# Patient Record
Sex: FEMALE | Race: BLACK OR AFRICAN AMERICAN | NOT HISPANIC OR LATINO
[De-identification: names, ages, dates, MRNs, and addresses within clinical notes are randomized per-mention and may not be internally consistent; named-entity substitution may affect disease eponyms.]

---

## 2017-01-31 ENCOUNTER — APPOINTMENT (OUTPATIENT)
Dept: CARDIOLOGY | Facility: CLINIC | Age: 82
End: 2017-01-31

## 2017-01-31 ENCOUNTER — NON-APPOINTMENT (OUTPATIENT)
Age: 82
End: 2017-01-31

## 2017-01-31 VITALS — DIASTOLIC BLOOD PRESSURE: 60 MMHG | SYSTOLIC BLOOD PRESSURE: 150 MMHG

## 2017-01-31 VITALS — SYSTOLIC BLOOD PRESSURE: 186 MMHG | HEART RATE: 60 BPM | DIASTOLIC BLOOD PRESSURE: 67 MMHG | OXYGEN SATURATION: 95 %

## 2017-01-31 DIAGNOSIS — E78.00 PURE HYPERCHOLESTEROLEMIA, UNSPECIFIED: ICD-10-CM

## 2017-01-31 DIAGNOSIS — I10 ESSENTIAL (PRIMARY) HYPERTENSION: ICD-10-CM

## 2017-01-31 DIAGNOSIS — R01.1 CARDIAC MURMUR, UNSPECIFIED: ICD-10-CM

## 2017-01-31 DIAGNOSIS — Z01.818 ENCOUNTER FOR OTHER PREPROCEDURAL EXAMINATION: ICD-10-CM

## 2017-02-13 ENCOUNTER — INPATIENT (INPATIENT)
Facility: HOSPITAL | Age: 82
LOS: 1 days | Discharge: HOME | DRG: 378 | End: 2017-02-15
Attending: HOSPITALIST | Admitting: INTERNAL MEDICINE
Payer: MEDICARE

## 2017-02-13 VITALS
HEART RATE: 63 BPM | TEMPERATURE: 97 F | HEIGHT: 64 IN | WEIGHT: 149.91 LBS | SYSTOLIC BLOOD PRESSURE: 125 MMHG | RESPIRATION RATE: 20 BRPM | OXYGEN SATURATION: 95 % | DIASTOLIC BLOOD PRESSURE: 62 MMHG

## 2017-02-13 DIAGNOSIS — K92.2 GASTROINTESTINAL HEMORRHAGE, UNSPECIFIED: ICD-10-CM

## 2017-02-13 LAB
ALBUMIN SERPL ELPH-MCNC: 2.9 G/DL — LOW (ref 3.3–5.2)
ALP SERPL-CCNC: 65 U/L — SIGNIFICANT CHANGE UP (ref 40–120)
ALT FLD-CCNC: 6 U/L — SIGNIFICANT CHANGE UP
ANION GAP SERPL CALC-SCNC: 10 MMOL/L — SIGNIFICANT CHANGE UP (ref 5–17)
APTT BLD: 32.3 SEC — SIGNIFICANT CHANGE UP (ref 27.5–37.4)
AST SERPL-CCNC: 15 U/L — SIGNIFICANT CHANGE UP
BASOPHILS # BLD AUTO: 0 K/UL — SIGNIFICANT CHANGE UP (ref 0–0.2)
BASOPHILS NFR BLD AUTO: 0.1 % — SIGNIFICANT CHANGE UP (ref 0–2)
BILIRUB SERPL-MCNC: 0.3 MG/DL — LOW (ref 0.4–2)
BLD GP AB SCN SERPL QL: SIGNIFICANT CHANGE UP
BUN SERPL-MCNC: 15 MG/DL — SIGNIFICANT CHANGE UP (ref 8–20)
CALCIUM SERPL-MCNC: 9.7 MG/DL — SIGNIFICANT CHANGE UP (ref 8.6–10.2)
CHLORIDE SERPL-SCNC: 104 MMOL/L — SIGNIFICANT CHANGE UP (ref 98–107)
CO2 SERPL-SCNC: 25 MMOL/L — SIGNIFICANT CHANGE UP (ref 22–29)
CREAT SERPL-MCNC: 0.55 MG/DL — SIGNIFICANT CHANGE UP (ref 0.5–1.3)
EOSINOPHIL # BLD AUTO: 0.1 K/UL — SIGNIFICANT CHANGE UP (ref 0–0.5)
EOSINOPHIL NFR BLD AUTO: 0.6 % — SIGNIFICANT CHANGE UP (ref 0–6)
GLUCOSE SERPL-MCNC: 150 MG/DL — HIGH (ref 70–115)
HCT VFR BLD CALC: 29.1 % — LOW (ref 37–47)
HCT VFR BLD CALC: 33.9 % — LOW (ref 37–47)
HGB BLD-MCNC: 10.5 G/DL — LOW (ref 12–16)
HGB BLD-MCNC: 9.1 G/DL — LOW (ref 12–16)
INR BLD: 1.15 RATIO — SIGNIFICANT CHANGE UP (ref 0.88–1.16)
LYMPHOCYTES # BLD AUTO: 0.8 K/UL — LOW (ref 1–4.8)
LYMPHOCYTES # BLD AUTO: 7.8 % — LOW (ref 20–55)
MCHC RBC-ENTMCNC: 26.3 PG — LOW (ref 27–31)
MCHC RBC-ENTMCNC: 26.5 PG — LOW (ref 27–31)
MCHC RBC-ENTMCNC: 31 G/DL — LOW (ref 32–36)
MCHC RBC-ENTMCNC: 31.3 G/DL — LOW (ref 32–36)
MCV RBC AUTO: 84.8 FL — SIGNIFICANT CHANGE UP (ref 81–99)
MCV RBC AUTO: 84.8 FL — SIGNIFICANT CHANGE UP (ref 81–99)
MONOCYTES # BLD AUTO: 0.7 K/UL — SIGNIFICANT CHANGE UP (ref 0–0.8)
MONOCYTES NFR BLD AUTO: 6.6 % — SIGNIFICANT CHANGE UP (ref 3–10)
NEUTROPHILS # BLD AUTO: 8.7 K/UL — HIGH (ref 1.8–8)
NEUTROPHILS NFR BLD AUTO: 84.5 % — HIGH (ref 37–73)
PLATELET # BLD AUTO: 214 K/UL — SIGNIFICANT CHANGE UP (ref 150–400)
PLATELET # BLD AUTO: 251 K/UL — SIGNIFICANT CHANGE UP (ref 150–400)
POTASSIUM SERPL-MCNC: 4.8 MMOL/L — SIGNIFICANT CHANGE UP (ref 3.5–5.3)
POTASSIUM SERPL-SCNC: 4.8 MMOL/L — SIGNIFICANT CHANGE UP (ref 3.5–5.3)
PROT SERPL-MCNC: 6.5 G/DL — LOW (ref 6.6–8.7)
PROTHROM AB SERPL-ACNC: 12.7 SEC — SIGNIFICANT CHANGE UP (ref 10–13.1)
RBC # BLD: 3.43 M/UL — LOW (ref 4.4–5.2)
RBC # BLD: 4 M/UL — LOW (ref 4.4–5.2)
RBC # FLD: 16 % — HIGH (ref 11–15.6)
RBC # FLD: 16.1 % — HIGH (ref 11–15.6)
SODIUM SERPL-SCNC: 139 MMOL/L — SIGNIFICANT CHANGE UP (ref 135–145)
TYPE + AB SCN PNL BLD: SIGNIFICANT CHANGE UP
WBC # BLD: 10.32 K/UL — SIGNIFICANT CHANGE UP (ref 4.8–10.8)
WBC # BLD: 9.42 K/UL — SIGNIFICANT CHANGE UP (ref 4.8–10.8)
WBC # FLD AUTO: 10.32 K/UL — SIGNIFICANT CHANGE UP (ref 4.8–10.8)
WBC # FLD AUTO: 9.42 K/UL — SIGNIFICANT CHANGE UP (ref 4.8–10.8)

## 2017-02-13 PROCEDURE — 93010 ELECTROCARDIOGRAM REPORT: CPT

## 2017-02-13 PROCEDURE — 74174 CTA ABD&PLVS W/CONTRAST: CPT | Mod: 26

## 2017-02-13 PROCEDURE — 99223 1ST HOSP IP/OBS HIGH 75: CPT

## 2017-02-13 PROCEDURE — 99285 EMERGENCY DEPT VISIT HI MDM: CPT

## 2017-02-13 PROCEDURE — 71010: CPT | Mod: 26

## 2017-02-13 RX ORDER — GABAPENTIN 400 MG/1
300 CAPSULE ORAL DAILY
Qty: 0 | Refills: 0 | Status: DISCONTINUED | OUTPATIENT
Start: 2017-02-13 | End: 2017-02-15

## 2017-02-13 RX ORDER — TIMOLOL 0.5 %
1 DROPS OPHTHALMIC (EYE)
Qty: 0 | Refills: 0 | Status: DISCONTINUED | OUTPATIENT
Start: 2017-02-13 | End: 2017-02-15

## 2017-02-13 RX ORDER — SODIUM CHLORIDE 9 MG/ML
3 INJECTION INTRAMUSCULAR; INTRAVENOUS; SUBCUTANEOUS ONCE
Qty: 0 | Refills: 0 | Status: COMPLETED | OUTPATIENT
Start: 2017-02-13 | End: 2017-02-13

## 2017-02-13 RX ORDER — BRIMONIDINE TARTRATE 2 MG/MG
1 SOLUTION/ DROPS OPHTHALMIC
Qty: 0 | Refills: 0 | Status: DISCONTINUED | OUTPATIENT
Start: 2017-02-13 | End: 2017-02-15

## 2017-02-13 RX ORDER — LATANOPROST 0.05 MG/ML
1 SOLUTION/ DROPS OPHTHALMIC; TOPICAL AT BEDTIME
Qty: 0 | Refills: 0 | Status: DISCONTINUED | OUTPATIENT
Start: 2017-02-13 | End: 2017-02-15

## 2017-02-13 RX ADMIN — LATANOPROST 1 DROP(S): 0.05 SOLUTION/ DROPS OPHTHALMIC; TOPICAL at 22:20

## 2017-02-13 RX ADMIN — Medication 1 DROP(S): at 19:26

## 2017-02-13 RX ADMIN — BRIMONIDINE TARTRATE 1 DROP(S): 2 SOLUTION/ DROPS OPHTHALMIC at 19:17

## 2017-02-13 RX ADMIN — SODIUM CHLORIDE 3 MILLILITER(S): 9 INJECTION INTRAMUSCULAR; INTRAVENOUS; SUBCUTANEOUS at 05:52

## 2017-02-13 NOTE — CONSULT NOTE ADULT - SUBJECTIVE AND OBJECTIVE BOX
HPI:   89 yo F with hx of hemorrhoids and previous GI bleeding secondary to diverticulosis presents to ED brought by family c/o rectal bleeding which started 2 days ago.  Family states blood was initially bright but became darker today with assoc abd pain and cramping.  Pt denies any assoc CP, but does admit to some SOB.  Family states pt supposed to have intestinal surgery for abnormal cells on her intestines.  GI consult for above complaints. patient underwent egd and colonoscopy 10/2016 when she was admitted here for rectal bleed. Hb dropped down to 9. She is Hindu. colonoscopy showed pan-diverticulosis with old blood, and a 3 cm cecal board based sessile polyp (path showed TV adenoma with focal HGD). As per patient's daughter, she is scheduled for surgery at [facility] in march 2017. hemodynamically stable.	    PAST MEDICAL/SURGICAL/FAMILY/SOCIAL HISTORY:   Past Medical History:  Borderline diabetes    Diverticulosis    Glaucoma    Hemorrhoid    Hypertension    Shingles.    Tobacco Usage:  · Tobacco Usage	Unknown if ever smoked	    ALLERGIES AND HOME MEDICATIONS:   Allergies:        Allergies:  	Tylenol: Drug, Other (Mild to Mod), palpitations, RdhlPriscilla E  	aspirin: Drug, Other (Mild to Mod), palpitations, Priscilla Leone    Home Medications:   * Incomplete Medication History as of 13-Feb-2017 05:05 documented in Prescription Writer  · 	ferrous sulfate 325 mg oral tablet: Last Dose Taken:  , 1 tab(s) orally 3 times a day  · 	insulin lispro 100 units/mL subcutaneous solution: Last Dose Taken:  ,  subcutaneous 4 times a day (before meals and at bedtime), 1 Unit(s) if Glucose 151 - 200  	2 Unit(s) if Glucose 201 - 250  	3 Unit(s) if Glucose 251 - 300  	4 Unit(s) if Glucose 301 - 350  	5 Unit(s) if Glucose 351 - 400  	6 Unit(s) if Glucose GREATER THAN 400  · 	traMADol 50 mg oral tablet: 1 tab(s) orally every 4 hours, As needed, Moderate Pain (4 - 6)  · 	docusate sodium 100 mg oral capsule: Last Dose Taken:  , 1 cap(s) orally 2 times a day  · 	polyethylene glycol 3350 oral powder for reconstitution: 17 gram(s) orally once a day, As needed, Constipation  · 	amLODIPine 10 mg oral tablet: Last Dose Taken:  , 1 tab(s) orally once a day  · 	Protonix 40 mg oral delayed release tablet: 1 tab(s) orally once a day  · 	Combigan 0.2%-0.5% ophthalmic solution: Last Dose Taken:  , 1 drop(s) to each affected eye every 12 hours  · 	Travatan 0.004% ophthalmic solution: 1 drop(s) to each affected eye once a day (in the evening)  · 	Catapres: Last Dose Taken:  , 0.3 milligram(s) transdermal once a week (changed on Sunday 9/25/16)  · 	metFORMIN 500 mg oral tablet: 1 tab(s) orally 2 times a day      REVIEW OF SYSTEMS    General: unremarkable, no fever    Skin/Breast: unremarkable  	  Ophthalmologic: unremarkable  	  ENMT:	unremarkable    Respiratory and Thorax: unremarkable  	  Cardiovascular:	unremarkable    Gastrointestinal:	 as above    Genitourinary:	unremarkable    Musculoskeletal:	unremarkable    Neurological:	unremarkable    Psychiatric:	unremarkable    Hematology/Lymphatics:	unremarkable    Endocrine:	unremarkable    Allergic/Immunologic:	unremarkable      MEDICATIONS  (STANDING):    MEDICATIONS  (PRN):      Allergies    aspirin (Other (Mild to Mod))  Tylenol (Other (Mild to Mod))    Intolerances        SOCIAL HISTORY:    FAMILY HISTORY:  No pertinent family history in first degree relatives      Vital Signs Last 24 Hrs  T(C): 36.3, Max: 36.3 (02-13 @ 05:01)  T(F): 97.4, Max: 97.4 (02-13 @ 05:01)  HR: 63 (63 - 63)  BP: 125/62 (125/62 - 125/62)  BP(mean): --  RR: 20 (20 - 20)  SpO2: 95% (95% - 95%)      PHYSICAL EXAM:    Constitutional: no fever, hemodynamically stable    Eyes: unremarkable    ENMT: unremarkable    Neck: unremarkable    Breasts: deferred    Back: unremarkable    Respiratory: unremarkable    Cardiovascular: unremarkable    Gastrointestinal: bowel sounds present, soft, non-tender, no organomegaly    Genitourinary: deferred    Rectal: deferred    Extremities: unremarkable    Vascular: unremarkable    Neurological: Awake, alert, oriented x3, no focal neurological deficit    Skin: unremarkable    Lymph Nodes: deferred    Musculoskeletal: unremarkable    Psychiatric: unremarkable    LABS:                        10.5   10.32 )-----------( 214      ( 13 Feb 2017 06:01 )             33.9     13 Feb 2017 06:01    139    |  104    |  15.0   ----------------------------<  150    4.8     |  25.0   |  0.55     Ca    9.7        13 Feb 2017 06:01    TPro  6.5    /  Alb  2.9    /  TBili  0.3    /  DBili  x      /  AST  15     /  ALT  6      /  AlkPhos  65     13 Feb 2017 06:01    PT/INR - ( 13 Feb 2017 06:01 )   PT: 12.7 sec;   INR: 1.15 ratio         PTT - ( 13 Feb 2017 06:01 )  PTT:32.3 sec      RADIOLOGY & ADDITIONAL STUDIES:

## 2017-02-13 NOTE — ED PROVIDER NOTE - OBJECTIVE STATEMENT
89 yo F with hx of hemorrhoids and previous GI bleeding secondary to diverticulosis presents to ED brought by family c/o rectal bleeding which started 2 days ago.  Family states blood was initially bright but became darker today with assoc abd pain and cramping.  Pt denies any assoc CP, but does admit to some SOB.  Family states pt supposed to have intestinal surgery for abnormal cells on her intestines

## 2017-02-13 NOTE — H&P ADULT. - HISTORY OF PRESENT ILLNESS
89 y F hx HTN, DM2, GIB, anemia, presented to ER c/o 2 day history of rectal bleeding, first bright red in color, then becoming darker yesterday. c/o intermittent abd cramping, NBNB vomiting x1. Had episode of GIB in oct, colonoscopy showed cecal sessile polyp, TV adenoma on path, had tentative surgery scheduled for March pending clearance. Denies F/C, CP, SOB, dysuria.

## 2017-02-13 NOTE — H&P ADULT. - ASSESSMENT
89 y F hx HTN, Dm2, colonic polyp, GIB adm w rectal bleeding.     GIB: monitor CBC, colonoscopy in oct showed adenomatous colonic polyp. GI evaluation appreciated, to consider rpt colonoscopy if hb declines. cont to monitor. Pt is W[employer], no blood transfusions. f/u CTA abd/pelvis  HTN: cont home meds as tolerated  DM: not on any meds, consistent carb diet.    Prophyl: scds

## 2017-02-13 NOTE — CONSULT NOTE ADULT - ASSESSMENT
IMPRESSION:  This is a 89 ys opld female with h/o diverticular bleed and a 3 cm cecal sessile TV adenoma with focal HGD (found on colonoscopy 10/16) [facility] to [facility]. She is Mormon. As per patient's daughter, she is scheduled for colon surgery at [facility] next month.    PLAN:  - monitor cbc  - if h/h dropping, will consider repeat colonoscopy and IV iron therapy    thanks for the consult.

## 2017-02-13 NOTE — ED ADULT NURSE NOTE - OBJECTIVE STATEMENT
Pt awake and alert x3, presents to ED c/o rectal bleeding x 3 days. Pt reports history of internal and external hemorrhoids. Pt reports bright red blood that progressed to maroon/dark red blood earlier today. Pt reports abd pain and discomfort. Pt reports abd cramping. Abd soft, nontender and nondistended. Pt denies any n,v,d. pt incontinent of urine and wears diaper. As per pt's daughter, pt recently diagnosed with precancerous ulcerative colitis. Pt resting comfortably in bed, family at bedside. Will continue to monitor.

## 2017-02-13 NOTE — ED PROVIDER NOTE - CONSTITUTIONAL, MLM
normal... Elderly F,  awake, alert, oriented to person, place, time/situation and in no apparent distress.

## 2017-02-14 LAB
ANION GAP SERPL CALC-SCNC: 9 MMOL/L — SIGNIFICANT CHANGE UP (ref 5–17)
BUN SERPL-MCNC: 11 MG/DL — SIGNIFICANT CHANGE UP (ref 8–20)
CALCIUM SERPL-MCNC: 10 MG/DL — SIGNIFICANT CHANGE UP (ref 8.6–10.2)
CHLORIDE SERPL-SCNC: 106 MMOL/L — SIGNIFICANT CHANGE UP (ref 98–107)
CO2 SERPL-SCNC: 23 MMOL/L — SIGNIFICANT CHANGE UP (ref 22–29)
CREAT SERPL-MCNC: 0.54 MG/DL — SIGNIFICANT CHANGE UP (ref 0.5–1.3)
GLUCOSE SERPL-MCNC: 104 MG/DL — SIGNIFICANT CHANGE UP (ref 70–115)
HCT VFR BLD CALC: 30.5 % — LOW (ref 37–47)
HGB BLD-MCNC: 9.4 G/DL — LOW (ref 12–16)
MAGNESIUM SERPL-MCNC: 2.4 MG/DL — SIGNIFICANT CHANGE UP (ref 1.8–2.5)
MCHC RBC-ENTMCNC: 26.1 PG — LOW (ref 27–31)
MCHC RBC-ENTMCNC: 30.8 G/DL — LOW (ref 32–36)
MCV RBC AUTO: 84.7 FL — SIGNIFICANT CHANGE UP (ref 81–99)
PHOSPHATE SERPL-MCNC: 2.7 MG/DL — SIGNIFICANT CHANGE UP (ref 2.4–4.7)
PLATELET # BLD AUTO: 296 K/UL — SIGNIFICANT CHANGE UP (ref 150–400)
POTASSIUM SERPL-MCNC: 4.1 MMOL/L — SIGNIFICANT CHANGE UP (ref 3.5–5.3)
POTASSIUM SERPL-SCNC: 4.1 MMOL/L — SIGNIFICANT CHANGE UP (ref 3.5–5.3)
RBC # BLD: 3.6 M/UL — LOW (ref 4.4–5.2)
RBC # FLD: 15.9 % — HIGH (ref 11–15.6)
SODIUM SERPL-SCNC: 138 MMOL/L — SIGNIFICANT CHANGE UP (ref 135–145)
TROPONIN T SERPL-MCNC: <0.01 NG/ML — SIGNIFICANT CHANGE UP (ref 0–0.06)
TROPONIN T SERPL-MCNC: <0.01 NG/ML — SIGNIFICANT CHANGE UP (ref 0–0.06)
WBC # BLD: 9.16 K/UL — SIGNIFICANT CHANGE UP (ref 4.8–10.8)
WBC # FLD AUTO: 9.16 K/UL — SIGNIFICANT CHANGE UP (ref 4.8–10.8)

## 2017-02-14 PROCEDURE — 93010 ELECTROCARDIOGRAM REPORT: CPT

## 2017-02-14 PROCEDURE — 99233 SBSQ HOSP IP/OBS HIGH 50: CPT

## 2017-02-14 RX ORDER — PANTOPRAZOLE SODIUM 20 MG/1
40 TABLET, DELAYED RELEASE ORAL
Qty: 0 | Refills: 0 | Status: DISCONTINUED | OUTPATIENT
Start: 2017-02-14 | End: 2017-02-15

## 2017-02-14 RX ORDER — HYDRALAZINE HCL 50 MG
25 TABLET ORAL DAILY
Qty: 0 | Refills: 0 | Status: DISCONTINUED | OUTPATIENT
Start: 2017-02-14 | End: 2017-02-15

## 2017-02-14 RX ORDER — AMLODIPINE BESYLATE 2.5 MG/1
10 TABLET ORAL DAILY
Qty: 0 | Refills: 0 | Status: DISCONTINUED | OUTPATIENT
Start: 2017-02-14 | End: 2017-02-15

## 2017-02-14 RX ADMIN — Medication 1 PATCH: at 12:38

## 2017-02-14 RX ADMIN — LATANOPROST 1 DROP(S): 0.05 SOLUTION/ DROPS OPHTHALMIC; TOPICAL at 22:51

## 2017-02-14 RX ADMIN — Medication 1 DROP(S): at 05:53

## 2017-02-14 RX ADMIN — GABAPENTIN 300 MILLIGRAM(S): 400 CAPSULE ORAL at 12:38

## 2017-02-14 RX ADMIN — BRIMONIDINE TARTRATE 1 DROP(S): 2 SOLUTION/ DROPS OPHTHALMIC at 17:14

## 2017-02-14 RX ADMIN — Medication 1 DROP(S): at 17:14

## 2017-02-14 RX ADMIN — Medication 25 MILLIGRAM(S): at 12:37

## 2017-02-14 RX ADMIN — PANTOPRAZOLE SODIUM 40 MILLIGRAM(S): 20 TABLET, DELAYED RELEASE ORAL at 22:49

## 2017-02-14 RX ADMIN — BRIMONIDINE TARTRATE 1 DROP(S): 2 SOLUTION/ DROPS OPHTHALMIC at 05:53

## 2017-02-14 NOTE — PROGRESS NOTE ADULT - SUBJECTIVE AND OBJECTIVE BOX
INTERVAL HPI/OVERNIGHT EVENTS:  Patient seen and examined    MEDICATIONS  (STANDING):  cloNIDine Patch 0.3 mG/24Hr(s) 1patch Topical every 7 days  gabapentin 300milliGRAM(s) Oral daily  latanoprost 0.005% Ophthalmic Solution 1Drop(s) Both EYES at bedtime  brimonidine 0.2% Ophthalmic Solution 1Drop(s) Both EYES two times a day  timolol 0.5% Solution 1Drop(s) Both EYES two times a day  pantoprazole    Tablet 40milliGRAM(s) Oral before breakfast  amLODIPine   Tablet 10milliGRAM(s) Oral daily  hydrALAZINE 25milliGRAM(s) Oral daily    MEDICATIONS  (PRN):      Allergies    aspirin (Other (Mild to Mod))  Tylenol (Other (Mild to Mod))    Intolerances        Vital Signs Last 24 Hrs  T(C): 36.6, Max: 36.7 (02-14 @ 04:52)  T(F): 97.8, Max: 98 (02-14 @ 04:52)  HR: 52 (52 - 70)  BP: 171/94 (154/57 - 171/94)  BP(mean): --  RR: 18 (16 - 18)  SpO2: 98% (94% - 99%)    PHYSICAL EXAM:  General: NAD.  CVS: S1, S2  Chest: air entry bilaterally present  Abd: BS present, soft, non-tender      LABS:                        9.4    9.16  )-----------( 296      ( 14 Feb 2017 08:18 )             30.5     14 Feb 2017 08:18    138    |  106    |  11.0   ----------------------------<  104    4.1     |  23.0   |  0.54     Ca    10.0       14 Feb 2017 08:18  Phos  2.7       14 Feb 2017 08:18  Mg     2.4       14 Feb 2017 08:18    TPro  6.5    /  Alb  2.9    /  TBili  0.3    /  DBili  x      /  AST  15     /  ALT  6      /  AlkPhos  65     13 Feb 2017 06:01    PT/INR - ( 13 Feb 2017 06:01 )   PT: 12.7 sec;   INR: 1.15 ratio         PTT - ( 13 Feb 2017 06:01 )  PTT:32.3 sec    CT abdomen/pelvis: CT angiography of the abdomen and pelvis shows no evidence of active   extravasation within the colon or rectum. Diffuse diverticulosis throughout the entire colon as noted. An  Right hepatic lobe superior segment 8 measuring 1.7 cm hypodense lesion of indeterminate etiology. An  Cholelithiasis without signs of acute cholecystitis. No evidence of arterial occlusion of atherosclerotic abdominal aorta.   Mesenteric vessels ramos

## 2017-02-14 NOTE — ED ADULT NURSE REASSESSMENT NOTE - CARDIO WDL
Normal rate, regular rhythm, normal S1, S2 heart sounds heard.
Normal rate, normal S1, S2 heart sounds heard.

## 2017-02-14 NOTE — PROGRESS NOTE ADULT - ASSESSMENT
89 y F hx HTN, Dm2, colonic polyp, GIB adm w rectal bleeding.     GIB with hx of TV adenoma, diverticulosis, hemorrhoids: monitor CBC in am, colonoscopy in oct showed adenomatous colonic polyp. GI evaluation appreciated, to consider rpt colonoscopy if hb declines. cont to monitor. Pt is Sikh, no blood transfusions. CTA abd/pelvis reviewed  HTN: cont home meds as tolerated  DM: not on any meds, consistent carb diet.  Chest pain: EKG with LVH & repolarization changes, repeat EKG, TnI x 3, Echo    Prophyl: scds

## 2017-02-14 NOTE — ED ADULT NURSE REASSESSMENT NOTE - NS ED NURSE REASSESS COMMENT FT1
Pt awake and alert x3, resting comfortably in bed. pt denies any pain or discomfort. Pt denies any dizziness or lightheadedness. Pt awaiting bed, will continue to monitor.
Received pt from day RN Jack Dodd. Pt awake and alert x3, denies any pain or discomfort. Pt denies any lightheadedness or dizziness. Pt resting comfortably in bed, family at bedside. Pt awaiting bed, will continue to monitor.
Pt awake and alert x3, resting comfortably in bed. pt denies any pain and discomfort. pt denies and chest pain or diff breathing. Pt awaiting bed, will continue to monitor.
patient received AOx4  without distress. negative complaints, vss as per RN on transfer of care. plan discussed with patient and family

## 2017-02-14 NOTE — PROGRESS NOTE ADULT - SUBJECTIVE AND OBJECTIVE BOX
CHIEF COMPLAINT/INTERVAL HISTORY:    Patient is a 89y old  Female who presents with a chief complaint of rectal bleed (13 Feb 2017 13:50)      HPI:  89 y F hx HTN, DM2, GIB, anemia, presented to ER c/o 2 day history of rectal bleeding, first bright red in color, then becoming darker yesterday. c/o intermittent abd cramping, NBNB vomiting x1. Had episode of GIB in oct, colonoscopy showed cecal sessile polyp, TV adenoma on path, had tentative surgery scheduled for March pending clearance. Denies F/C, CP, SOB, dysuria. (13 Feb 2017 13:50)      SUBJECTIVE & OBJECTIVE: Pt seen and examined at bedside. Denies any further GI bleeding/N/V/abd pain, palp, dizziness. But admits to non exertional pressure like chest pain lasting early this morning. No exacerbating/ aggrevating factors.     ICU Vital Signs Last 24 Hrs  T(C): 36.2, Max: 36.7 (02-13 @ 10:51)  T(F): 97.2, Max: 98.1 (02-13 @ 10:51)  HR: 56 (56 - 90)  BP: 155/66 (150/60 - 171/68)  BP(mean): --  ABP: --  ABP(mean): --  RR: 16 (16 - 20)  SpO2: 99% (94% - 99%)        MEDICATIONS  (STANDING):  cloNIDine Patch 0.3 mG/24Hr(s) 1patch Topical every 7 days  gabapentin 300milliGRAM(s) Oral daily  latanoprost 0.005% Ophthalmic Solution 1Drop(s) Both EYES at bedtime  brimonidine 0.2% Ophthalmic Solution 1Drop(s) Both EYES two times a day  timolol 0.5% Solution 1Drop(s) Both EYES two times a day  pantoprazole    Tablet 40milliGRAM(s) Oral before breakfast  amLODIPine   Tablet 10milliGRAM(s) Oral daily  hydrALAZINE 25milliGRAM(s) Oral daily    MEDICATIONS  (PRN):      LABS:                        9.4    9.16  )-----------( 296      ( 14 Feb 2017 08:18 )             30.5     14 Feb 2017 08:18    138    |  106    |  11.0   ----------------------------<  104    4.1     |  23.0   |  0.54     Ca    10.0       14 Feb 2017 08:18  Phos  2.7       14 Feb 2017 08:18  Mg     2.4       14 Feb 2017 08:18    TPro  6.5    /  Alb  2.9    /  TBili  0.3    /  DBili  x      /  AST  15     /  ALT  6      /  AlkPhos  65     13 Feb 2017 06:01    PT/INR - ( 13 Feb 2017 06:01 )   PT: 12.7 sec;   INR: 1.15 ratio         PTT - ( 13 Feb 2017 06:01 )  PTT:32.3 sec      CAPILLARY BLOOD GLUCOSE      RECENT CULTURES:      RADIOLOGY & ADDITIONAL TESTS:      PHYSICAL EXAM:    GENERAL: NAD  HEAD:  Atraumatic, Normocephalic  EYES: EOMI, PERRLA, conjunctiva and sclera clear  ENMT: Moist mucous membranes  NECK: Supple, No JVD  NERVOUS SYSTEM:  Alert & Oriented X3, Motor Strength 5/5 B/L upper and lower extremities; DTRs 2+ intact and symmetric  CHEST/LUNG: Clear to auscultation bilaterally; No rales, rhonchi, wheezing, or rubs  HEART: Regular rate and rhythm; No murmurs, rubs, or gallops  ABDOMEN: Soft, Nontender, Nondistended; Bowel sounds present  EXTREMITIES:  2+ Peripheral Pulses, No clubbing, cyanosis, or edema CHIEF COMPLAINT/INTERVAL HISTORY:    Patient is a 89y old  Female who presents with a chief complaint of rectal bleed (13 Feb 2017 13:50)      HPI:  89 y F hx HTN, DM2, GIB, anemia, presented to ER c/o 2 day history of rectal bleeding, first bright red in color, then becoming darker yesterday. c/o intermittent abd cramping, NBNB vomiting x1. Had episode of GIB in oct, colonoscopy showed cecal sessile polyp, TV adenoma on path, had tentative surgery scheduled for March pending clearance. Denies F/C, CP, SOB, dysuria. (13 Feb 2017 13:50)      SUBJECTIVE & OBJECTIVE: Pt seen and examined at bedside. Denies any further GI bleeding/N/V/abd pain, palp, dizziness. But admits to non exertional pressure like chest pain lasting early this morning. No exacerbating/ aggrevating factors.     ICU Vital Signs Last 24 Hrs  T(C): 36.2, Max: 36.7 (02-13 @ 10:51)  T(F): 97.2, Max: 98.1 (02-13 @ 10:51)  HR: 56 (56 - 90)  BP: 155/66 (150/60 - 171/68)  BP(mean): --  ABP: --  ABP(mean): --  RR: 16 (16 - 20)  SpO2: 99% (94% - 99%)        MEDICATIONS  (STANDING):  cloNIDine Patch 0.3 mG/24Hr(s) 1patch Topical every 7 days  gabapentin 300milliGRAM(s) Oral daily  latanoprost 0.005% Ophthalmic Solution 1Drop(s) Both EYES at bedtime  brimonidine 0.2% Ophthalmic Solution 1Drop(s) Both EYES two times a day  timolol 0.5% Solution 1Drop(s) Both EYES two times a day  pantoprazole    Tablet 40milliGRAM(s) Oral before breakfast  amLODIPine   Tablet 10milliGRAM(s) Oral daily  hydrALAZINE 25milliGRAM(s) Oral daily    MEDICATIONS  (PRN):      LABS:                        9.4    9.16  )-----------( 296      ( 14 Feb 2017 08:18 )             30.5     14 Feb 2017 08:18    138    |  106    |  11.0   ----------------------------<  104    4.1     |  23.0   |  0.54     Ca    10.0       14 Feb 2017 08:18  Phos  2.7       14 Feb 2017 08:18  Mg     2.4       14 Feb 2017 08:18    TPro  6.5    /  Alb  2.9    /  TBili  0.3    /  DBili  x      /  AST  15     /  ALT  6      /  AlkPhos  65     13 Feb 2017 06:01    PT/INR - ( 13 Feb 2017 06:01 )   PT: 12.7 sec;   INR: 1.15 ratio         PTT - ( 13 Feb 2017 06:01 )  PTT:32.3 sec      CAPILLARY BLOOD GLUCOSE      RECENT CULTURES:      RADIOLOGY & ADDITIONAL TESTS:      PHYSICAL EXAM:    GENERAL: NAD  HEAD:  Atraumatic, Normocephalic  EYES: EOMI, PERRLA, conjunctiva and sclera clear  ENMT: Moist mucous membranes  NECK: Supple, No JVD  NERVOUS SYSTEM:  Alert & Oriented X3, Motor Strength 5/5 B/L upper and lower extremities; DTRs 2+ intact and symmetric  CHEST/LUNG: Clear to auscultation bilaterally; No rales, rhonchi, wheezing, or rubs  HEART: Regular rate and rhythm; 2/6 systolic murmur,no rubs, or gallops  ABDOMEN: Soft, Nontender, Nondistended; Bowel sounds present  EXTREMITIES:  2+ Peripheral Pulses, No clubbing, cyanosis, or edema

## 2017-02-15 ENCOUNTER — TRANSCRIPTION ENCOUNTER (OUTPATIENT)
Age: 82
End: 2017-02-15

## 2017-02-15 VITALS
TEMPERATURE: 98 F | HEART RATE: 60 BPM | DIASTOLIC BLOOD PRESSURE: 74 MMHG | RESPIRATION RATE: 16 BRPM | SYSTOLIC BLOOD PRESSURE: 123 MMHG

## 2017-02-15 LAB
ANION GAP SERPL CALC-SCNC: 9 MMOL/L — SIGNIFICANT CHANGE UP (ref 5–17)
BASOPHILS # BLD AUTO: 0 K/UL — SIGNIFICANT CHANGE UP (ref 0–0.2)
BASOPHILS NFR BLD AUTO: 0.1 % — SIGNIFICANT CHANGE UP (ref 0–2)
BUN SERPL-MCNC: 11 MG/DL — SIGNIFICANT CHANGE UP (ref 8–20)
CALCIUM SERPL-MCNC: 9.5 MG/DL — SIGNIFICANT CHANGE UP (ref 8.6–10.2)
CHLORIDE SERPL-SCNC: 101 MMOL/L — SIGNIFICANT CHANGE UP (ref 98–107)
CO2 SERPL-SCNC: 25 MMOL/L — SIGNIFICANT CHANGE UP (ref 22–29)
CREAT SERPL-MCNC: 0.49 MG/DL — LOW (ref 0.5–1.3)
EOSINOPHIL # BLD AUTO: 0.1 K/UL — SIGNIFICANT CHANGE UP (ref 0–0.5)
EOSINOPHIL NFR BLD AUTO: 1.3 % — SIGNIFICANT CHANGE UP (ref 0–6)
GLUCOSE SERPL-MCNC: 116 MG/DL — HIGH (ref 70–115)
HCT VFR BLD CALC: 28.9 % — LOW (ref 37–47)
HGB BLD-MCNC: 9.1 G/DL — LOW (ref 12–16)
LYMPHOCYTES # BLD AUTO: 0.8 K/UL — LOW (ref 1–4.8)
LYMPHOCYTES # BLD AUTO: 11.5 % — LOW (ref 20–55)
MAGNESIUM SERPL-MCNC: 2.2 MG/DL — SIGNIFICANT CHANGE UP (ref 1.8–2.5)
MCHC RBC-ENTMCNC: 26.6 PG — LOW (ref 27–31)
MCHC RBC-ENTMCNC: 31.5 G/DL — LOW (ref 32–36)
MCV RBC AUTO: 84.5 FL — SIGNIFICANT CHANGE UP (ref 81–99)
MONOCYTES # BLD AUTO: 0.8 K/UL — SIGNIFICANT CHANGE UP (ref 0–0.8)
MONOCYTES NFR BLD AUTO: 11.3 % — HIGH (ref 3–10)
NEUTROPHILS # BLD AUTO: 5.4 K/UL — SIGNIFICANT CHANGE UP (ref 1.8–8)
NEUTROPHILS NFR BLD AUTO: 75.4 % — HIGH (ref 37–73)
PHOSPHATE SERPL-MCNC: 2.7 MG/DL — SIGNIFICANT CHANGE UP (ref 2.4–4.7)
PLATELET # BLD AUTO: 302 K/UL — SIGNIFICANT CHANGE UP (ref 150–400)
POTASSIUM SERPL-MCNC: 3.7 MMOL/L — SIGNIFICANT CHANGE UP (ref 3.5–5.3)
POTASSIUM SERPL-SCNC: 3.7 MMOL/L — SIGNIFICANT CHANGE UP (ref 3.5–5.3)
RBC # BLD: 3.42 M/UL — LOW (ref 4.4–5.2)
RBC # FLD: 15.7 % — HIGH (ref 11–15.6)
SODIUM SERPL-SCNC: 135 MMOL/L — SIGNIFICANT CHANGE UP (ref 135–145)
TROPONIN T SERPL-MCNC: <0.01 NG/ML — SIGNIFICANT CHANGE UP (ref 0–0.06)
WBC # BLD: 7.2 K/UL — SIGNIFICANT CHANGE UP (ref 4.8–10.8)
WBC # FLD AUTO: 7.2 K/UL — SIGNIFICANT CHANGE UP (ref 4.8–10.8)

## 2017-02-15 PROCEDURE — 93005 ELECTROCARDIOGRAM TRACING: CPT

## 2017-02-15 PROCEDURE — 85730 THROMBOPLASTIN TIME PARTIAL: CPT

## 2017-02-15 PROCEDURE — 84100 ASSAY OF PHOSPHORUS: CPT

## 2017-02-15 PROCEDURE — 83735 ASSAY OF MAGNESIUM: CPT

## 2017-02-15 PROCEDURE — 85610 PROTHROMBIN TIME: CPT

## 2017-02-15 PROCEDURE — 84484 ASSAY OF TROPONIN QUANT: CPT

## 2017-02-15 PROCEDURE — 99285 EMERGENCY DEPT VISIT HI MDM: CPT | Mod: 25

## 2017-02-15 PROCEDURE — 85027 COMPLETE CBC AUTOMATED: CPT

## 2017-02-15 PROCEDURE — 86901 BLOOD TYPING SEROLOGIC RH(D): CPT

## 2017-02-15 PROCEDURE — 80053 COMPREHEN METABOLIC PANEL: CPT

## 2017-02-15 PROCEDURE — 80048 BASIC METABOLIC PNL TOTAL CA: CPT

## 2017-02-15 PROCEDURE — 36415 COLL VENOUS BLD VENIPUNCTURE: CPT

## 2017-02-15 PROCEDURE — 74174 CTA ABD&PLVS W/CONTRAST: CPT

## 2017-02-15 PROCEDURE — 86900 BLOOD TYPING SEROLOGIC ABO: CPT

## 2017-02-15 PROCEDURE — 86850 RBC ANTIBODY SCREEN: CPT

## 2017-02-15 PROCEDURE — 71045 X-RAY EXAM CHEST 1 VIEW: CPT

## 2017-02-15 RX ADMIN — GABAPENTIN 300 MILLIGRAM(S): 400 CAPSULE ORAL at 12:09

## 2017-02-15 RX ADMIN — Medication 25 MILLIGRAM(S): at 08:13

## 2017-02-15 RX ADMIN — BRIMONIDINE TARTRATE 1 DROP(S): 2 SOLUTION/ DROPS OPHTHALMIC at 08:15

## 2017-02-15 RX ADMIN — Medication 1 DROP(S): at 08:14

## 2017-02-15 RX ADMIN — PANTOPRAZOLE SODIUM 40 MILLIGRAM(S): 20 TABLET, DELAYED RELEASE ORAL at 08:12

## 2017-02-15 NOTE — DISCHARGE NOTE ADULT - CARE PLAN
Principal Discharge DX:	GI bleed  Secondary Diagnosis:	Diverticulosis  Secondary Diagnosis:	Hypertension Principal Discharge DX:	GI bleed  Goal:	To monitor health issue and prevent readmission  Secondary Diagnosis:	Diverticulosis  Secondary Diagnosis:	Hypertension Principal Discharge DX:	GI bleed  Goal:	To monitor health issue and prevent readmission  Instructions for follow-up, activity and diet:	Patient to follow up for colon surgery sched in a few weeks at [facility]  Secondary Diagnosis:	Diverticulosis  Goal:	Monitor to prevent readmission  Instructions for follow-up, activity and diet:	Follow up with Gastroenterology to monitor  Secondary Diagnosis:	Hypertension  Goal:	Control blood pressure  Instructions for follow-up, activity and diet:	Continue home blood pressure meds Principal Discharge DX:	GI bleed  Goal:	To monitor health issue and prevent readmission  Instructions for follow-up, activity and diet:	Patient to follow up for colon surgery sched in a few weeks at [facility]  Secondary Diagnosis:	Diverticulosis  Goal:	Monitor to prevent readmission  Instructions for follow-up, activity and diet:	Follow up with Gastroenterology to monitor  Secondary Diagnosis:	Hypertension  Goal:	Control blood pressure  Instructions for follow-up, activity and diet:	Continue home blood pressure meds  Secondary Diagnosis:	Unstable angina  Instructions for follow-up, activity and diet:	Pls follow up with cardiology within 1 week of discharge Principal Discharge DX:	GI bleed  Goal:	To monitor health issue and prevent readmission  Instructions for follow-up, activity and diet:	Patient to follow up for colon surgery sched in a few weeks at [facility]  Secondary Diagnosis:	Diverticulosis  Goal:	Monitor to prevent readmission  Instructions for follow-up, activity and diet:	Follow up with Gastroenterology to monitor  Secondary Diagnosis:	Hypertension  Goal:	Control blood pressure  Instructions for follow-up, activity and diet:	Continue home blood pressure meds  Secondary Diagnosis:	Unstable angina  Instructions for follow-up, activity and diet:	Pls follow up with cardiology within 1 week of discharge Principal Discharge DX:	GI bleed  Goal:	To monitor health issue and prevent readmission  Instructions for follow-up, activity and diet:	Patient to follow up for colon surgery sched in a few weeks at [facility]  Secondary Diagnosis:	Diverticulosis  Goal:	Monitor to prevent readmission  Instructions for follow-up, activity and diet:	Follow up with Gastroenterology to monitor  Secondary Diagnosis:	Hypertension  Goal:	Control blood pressure  Instructions for follow-up, activity and diet:	Continue home blood pressure meds  Secondary Diagnosis:	Unstable angina  Instructions for follow-up, activity and diet:	Pls follow up with cardiology within 1 week of discharge

## 2017-02-15 NOTE — PROGRESS NOTE ADULT - SUBJECTIVE AND OBJECTIVE BOX
INTERVAL HPI/OVERNIGHT EVENTS:  Patient seen and examined    MEDICATIONS  (STANDING):  cloNIDine Patch 0.3 mG/24Hr(s) 1patch Topical every 7 days  gabapentin 300milliGRAM(s) Oral daily  latanoprost 0.005% Ophthalmic Solution 1Drop(s) Both EYES at bedtime  brimonidine 0.2% Ophthalmic Solution 1Drop(s) Both EYES two times a day  timolol 0.5% Solution 1Drop(s) Both EYES two times a day  pantoprazole    Tablet 40milliGRAM(s) Oral before breakfast  amLODIPine   Tablet 10milliGRAM(s) Oral daily  hydrALAZINE 25milliGRAM(s) Oral daily    MEDICATIONS  (PRN):      Allergies    aspirin (Other (Mild to Mod))  Tylenol (Other (Mild to Mod))    Intolerances        Vital Signs Last 24 Hrs  T(C): 36.4, Max: 36.7 (02-15 @ 00:29)  T(F): 97.5, Max: 98 (02-15 @ 00:29)  HR: 60 (52 - 66)  BP: 123/74 (123/74 - 171/94)  BP(mean): --  RR: 16 (16 - 18)  SpO2: 96% (94% - 98%)    PHYSICAL EXAM:  General: NAD.  CVS: S1, S2  Chest: air entry bilaterally present  Abd: BS present, soft, non-tender      LABS:                        9.1    7.2   )-----------( 302      ( 15 Feb 2017 08:00 )             28.9     15 Feb 2017 08:00    135    |  101    |  11.0   ----------------------------<  116    3.7     |  25.0   |  0.49     Ca    9.5        15 Feb 2017 08:00  Phos  2.7       15 Feb 2017 08:00  Mg     2.2       15 Feb 2017 08:00          :

## 2017-02-15 NOTE — PROGRESS NOTE ADULT - ASSESSMENT
IMPRESSION:  This is a 89 ys opld female with h/o diverticular bleed and a 3 cm cecal sessile TV adenoma with focal HGD (found on colonoscopy 10/16) who was admitted with recurrent rectal bleed- most likley due to recurrent diverticular bleed. She is Confucianism. As per patient's daughter, she is scheduled for colon surgery at [facility] next month. No furhtur rectal bleed. h/h stable.    PLAN:  - gi clinic as outpatient  - f/u with surgery at [facility] as outpatient.

## 2017-02-15 NOTE — DISCHARGE NOTE ADULT - PLAN OF CARE
To monitor health issue and prevent readmission Patient to follow up for colon surgery sched in a few weeks at [facility] Follow up with Gastroenterology to monitor Monitor to prevent readmission Control blood pressure Continue home blood pressure meds Pls follow up with cardiology within 1 week of discharge

## 2017-02-15 NOTE — DISCHARGE NOTE ADULT - PATIENT PORTAL LINK FT
“You can access the [facility] Patient Portal, offered by [facility], by registering with the following website: [email or web address]”

## 2017-02-15 NOTE — DISCHARGE NOTE ADULT - CARE PROVIDERS DIRECT ADDRESSES
,DirectAddress_Unknown,[email or web address],DirectAddress_Unknown,DirectAddress_Unknown

## 2017-02-15 NOTE — DISCHARGE NOTE ADULT - NS AS ACTIVITY OBS
Walking-Indoors allowed/Do not drive or operate machinery/Showering allowed/As tolerated/No Heavy lifting/straining/Bathing allowed/Walking-Outdoors allowed

## 2017-02-15 NOTE — DISCHARGE NOTE ADULT - HOSPITAL COURSE
Patient was admitted with a GI Bleed and a 3 cm adenmatous cecal sessile polyp.  She had a workup as an outpatient prior to admission and is scheduled to have colon surgery  in a few weeks at [facility],  She is a Je[employer] Witness and does not accept blood transfusions.  During her hospital stay she had a CT scan of the abdomen and a Gastroenterology consult.  She is now medically stable for discharge and will follow up for her scheduled surgery. Patient was admitted with a GI Bleed and a 3 cm adenmatous cecal sessile polyp.  She had a workup as an outpatient prior to admission and is scheduled to have colon surgery  in a few weeks at [facility],  She is a Jehov[employer] Witness and does not accept blood transfusions.  During her hospital stay she had a CT scan of the abdomen and a Gastroenterology consult. Pt also c/o left sided  chest pain at rest on-off, pressure like, EKG showed TWI in lateral leads unchanged from previous EKGs, TnI x 3 -ve, Recent Echo was done in Dec as per cardio Dr. Maynard which showed EF 60%. Pt is known to Dr. Torrez and has refused cath in the past. Recommended f/u with cardiology as outpatient  She is now medically stable for discharge and will follow up for her scheduled surgery.

## 2017-02-15 NOTE — DISCHARGE NOTE ADULT - CARE PROVIDER_API CALL
Abraham Winston), Gastroenterology  [address] [place] [address]  Phone: [phone]  Fax: [phone]    Pedro Torrez), Cardiovascular Disease; Internal Medicine; Interventional Cardiology  [address] [place] [address]  Phone: [phone]  Fax: [phone]    Suzie,   Phone: (   )    -  Fax: (   )    -

## 2017-02-15 NOTE — DISCHARGE NOTE ADULT - PROVIDER TOKENS
TOKEN:'[id number]:'[id number]',FREE:[LAST[facility]PHONE:[(   )    -],FAX:[(   )    -]]

## 2017-02-15 NOTE — DISCHARGE NOTE ADULT - MEDICATION SUMMARY - MEDICATIONS TO TAKE
I will START or STAY ON the medications listed below when I get home from the hospital:    Catapres  -- 0.3 milligram(s) by transdermal patch once a week (changed on Sunday 9/25/16)  -- Indication: For cardiac    gabapentin 300 mg oral capsule  --  by mouth once a day  -- Indication: For CNS    felodipine 10 mg oral tablet, extended release  -- 1 tab(s) by mouth once a day  -- Indication: For Cardiac    Combigan 0.2%-0.5% ophthalmic solution  -- 1 drop(s) to each affected eye every 12 hours  -- Indication: For ophthalmic    Travatan 0.004% ophthalmic solution  -- 1 drop(s) to each affected eye once a day (in the evening)  -- Indication: For ophthalmicGI    Protonix 40 mg oral delayed release tablet  -- 1 tab(s) by mouth once a day  -- Indication: For Hypertension    Levaquin 250 mg oral tablet  -- 1 tab(s) by mouth every 24 hours x7d .  Started 2-11  -- started 2/11  -- Indication: For antibiotic    hydrALAZINE 25 mg oral tablet  --  by mouth once a day  -- Indication: For cardiac I will START or STAY ON the medications listed below when I get home from the hospital:    Catapres  -- 0.3 milligram(s) by transdermal patch once a week (changed on Sunday 9/25/16)  -- Indication: For cardiac    gabapentin 300 mg oral capsule  --  by mouth once a day  -- Indication: For CNS    felodipine 10 mg oral tablet, extended release  -- 1 tab(s) by mouth once a day  -- Indication: For Cardiac    Combigan 0.2%-0.5% ophthalmic solution  -- 1 drop(s) to each affected eye every 12 hours  -- Indication: For ophthalmic    Travatan 0.004% ophthalmic solution  -- 1 drop(s) to each affected eye once a day (in the evening)  -- Indication: For ophthalmicGI    Protonix 40 mg oral delayed release tablet  -- 1 tab(s) by mouth once a day  -- Indication: For GERD    Levaquin 250 mg oral tablet  -- 1 tab(s) by mouth every 24 hours x7d .  Started 2-11  -- started 2/11  -- Indication: For antibiotic    hydrALAZINE 25 mg oral tablet  --  by mouth once a day  -- Indication: For cardiac

## 2017-03-10 ENCOUNTER — APPOINTMENT (OUTPATIENT)
Dept: COLORECTAL SURGERY | Facility: CLINIC | Age: 82
End: 2017-03-10

## 2017-03-10 VITALS
RESPIRATION RATE: 13 BRPM | SYSTOLIC BLOOD PRESSURE: 160 MMHG | DIASTOLIC BLOOD PRESSURE: 70 MMHG | BODY MASS INDEX: 24.75 KG/M2 | HEIGHT: 64 IN | HEART RATE: 72 BPM | WEIGHT: 145 LBS | OXYGEN SATURATION: 99 % | TEMPERATURE: 98.6 F

## 2017-03-10 DIAGNOSIS — C18.2 MALIGNANT NEOPLASM OF ASCENDING COLON: ICD-10-CM

## 2017-03-10 DIAGNOSIS — K63.5 POLYP OF COLON: ICD-10-CM

## 2017-03-10 DIAGNOSIS — H26.9 UNSPECIFIED CATARACT: ICD-10-CM

## 2017-03-10 DIAGNOSIS — Z87.19 PERSONAL HISTORY OF OTHER DISEASES OF THE DIGESTIVE SYSTEM: ICD-10-CM

## 2017-03-10 DIAGNOSIS — H40.9 UNSPECIFIED GLAUCOMA: ICD-10-CM

## 2017-03-10 DIAGNOSIS — Z87.891 PERSONAL HISTORY OF NICOTINE DEPENDENCE: ICD-10-CM

## 2017-03-10 RX ORDER — NEOMYCIN SULFATE 500 MG/1
500 TABLET ORAL
Qty: 6 | Refills: 0 | Status: ACTIVE | COMMUNITY
Start: 2017-03-10 | End: 1900-01-01

## 2017-03-10 RX ORDER — SODIUM PICOSULFATE, MAGNESIUM OXIDE, AND ANHYDROUS CITRIC ACID 10; 3.5; 12 MG/16.2G; G/16.2G; G/16.2G
10-3.5-12 POWDER, METERED ORAL
Qty: 1 | Refills: 0 | Status: ACTIVE | COMMUNITY
Start: 2017-03-10 | End: 1900-01-01

## 2017-03-10 RX ORDER — METRONIDAZOLE 500 MG/1
500 TABLET ORAL
Qty: 3 | Refills: 0 | Status: ACTIVE | COMMUNITY
Start: 2017-03-10 | End: 1900-01-01

## 2017-03-16 PROBLEM — H26.9 CATARACT: Status: ACTIVE | Noted: 2017-03-10

## 2017-03-16 PROBLEM — C18.2 MALIGNANT NEOPLASM OF ASCENDING COLON: Status: ACTIVE | Noted: 2017-03-16

## 2017-03-16 PROBLEM — K63.5 COLON POLYPS: Status: ACTIVE | Noted: 2017-03-16

## 2017-03-16 PROBLEM — Z87.19 HISTORY OF CONSTIPATION: Status: RESOLVED | Noted: 2017-03-10 | Resolved: 2017-03-16

## 2017-03-16 PROBLEM — H40.9 GLAUCOMA: Status: ACTIVE | Noted: 2017-03-10

## 2017-03-16 PROBLEM — Z87.891 FORMER SMOKER: Status: ACTIVE | Noted: 2017-03-10

## 2017-03-16 RX ORDER — ASPIRIN/ACETAMINOPHEN/CAFFEINE 250-250-65
325 (65 FE) TABLET ORAL
Refills: 0 | Status: ACTIVE | COMMUNITY

## 2017-03-16 RX ORDER — GABAPENTIN 300 MG/1
300 CAPSULE ORAL
Refills: 0 | Status: ACTIVE | COMMUNITY

## 2017-03-16 RX ORDER — DOCUSATE SODIUM 100 MG/1
100 CAPSULE ORAL 3 TIMES DAILY
Refills: 0 | Status: ACTIVE | COMMUNITY

## 2017-03-21 ENCOUNTER — OUTPATIENT (OUTPATIENT)
Dept: OUTPATIENT SERVICES | Facility: HOSPITAL | Age: 82
LOS: 1 days | End: 2017-03-21
Payer: MEDICARE

## 2017-03-21 ENCOUNTER — OTHER (OUTPATIENT)
Age: 82
End: 2017-03-21

## 2017-03-21 VITALS
HEART RATE: 64 BPM | WEIGHT: 141.1 LBS | TEMPERATURE: 96 F | HEIGHT: 63 IN | DIASTOLIC BLOOD PRESSURE: 68 MMHG | SYSTOLIC BLOOD PRESSURE: 145 MMHG | RESPIRATION RATE: 16 BRPM

## 2017-03-21 DIAGNOSIS — I38 ENDOCARDITIS, VALVE UNSPECIFIED: ICD-10-CM

## 2017-03-21 DIAGNOSIS — I10 ESSENTIAL (PRIMARY) HYPERTENSION: ICD-10-CM

## 2017-03-21 DIAGNOSIS — K63.9 DISEASE OF INTESTINE, UNSPECIFIED: ICD-10-CM

## 2017-03-21 DIAGNOSIS — Z01.818 ENCOUNTER FOR OTHER PREPROCEDURAL EXAMINATION: ICD-10-CM

## 2017-03-21 LAB
ALBUMIN SERPL ELPH-MCNC: 3.6 G/DL — SIGNIFICANT CHANGE UP (ref 3.3–5.2)
ALP SERPL-CCNC: 69 U/L — SIGNIFICANT CHANGE UP (ref 40–120)
ALT FLD-CCNC: 7 U/L — SIGNIFICANT CHANGE UP
ANION GAP SERPL CALC-SCNC: 10 MMOL/L — SIGNIFICANT CHANGE UP (ref 5–17)
ANISOCYTOSIS BLD QL: SLIGHT — SIGNIFICANT CHANGE UP
APTT BLD: 33.9 SEC — SIGNIFICANT CHANGE UP (ref 27.5–37.4)
AST SERPL-CCNC: 15 U/L — SIGNIFICANT CHANGE UP
BILIRUB SERPL-MCNC: 0.2 MG/DL — LOW (ref 0.4–2)
BUN SERPL-MCNC: 8 MG/DL — SIGNIFICANT CHANGE UP (ref 8–20)
CALCIUM SERPL-MCNC: 10.4 MG/DL — HIGH (ref 8.6–10.2)
CHLORIDE SERPL-SCNC: 101 MMOL/L — SIGNIFICANT CHANGE UP (ref 98–107)
CO2 SERPL-SCNC: 27 MMOL/L — SIGNIFICANT CHANGE UP (ref 22–29)
CREAT SERPL-MCNC: 0.6 MG/DL — SIGNIFICANT CHANGE UP (ref 0.5–1.3)
ELLIPTOCYTES BLD QL SMEAR: SLIGHT — SIGNIFICANT CHANGE UP
EOSINOPHIL NFR BLD AUTO: 1 % — SIGNIFICANT CHANGE UP (ref 0–5)
GLUCOSE SERPL-MCNC: 106 MG/DL — SIGNIFICANT CHANGE UP (ref 70–115)
HBA1C BLD-MCNC: 4.7 % — SIGNIFICANT CHANGE UP (ref 4–5.6)
HCT VFR BLD CALC: 46.2 % — SIGNIFICANT CHANGE UP (ref 37–47)
HGB BLD-MCNC: 14.3 G/DL — SIGNIFICANT CHANGE UP (ref 12–16)
INR BLD: 1.07 RATIO — SIGNIFICANT CHANGE UP (ref 0.88–1.16)
LYMPHOCYTES # BLD AUTO: 9 % — LOW (ref 20–55)
MACROCYTES BLD QL: SLIGHT — SIGNIFICANT CHANGE UP
MCHC RBC-ENTMCNC: 28.5 PG — SIGNIFICANT CHANGE UP (ref 27–31)
MCHC RBC-ENTMCNC: 31 G/DL — LOW (ref 32–36)
MCV RBC AUTO: 92.2 FL — SIGNIFICANT CHANGE UP (ref 81–99)
MICROCYTES BLD QL: SLIGHT — SIGNIFICANT CHANGE UP
MONOCYTES NFR BLD AUTO: 3 % — SIGNIFICANT CHANGE UP (ref 3–10)
NEUTROPHILS NFR BLD AUTO: 85 % — HIGH (ref 37–73)
PLAT MORPH BLD: NORMAL — SIGNIFICANT CHANGE UP
PLATELET # BLD AUTO: 244 K/UL — SIGNIFICANT CHANGE UP (ref 150–400)
POIKILOCYTOSIS BLD QL AUTO: SLIGHT — SIGNIFICANT CHANGE UP
POTASSIUM SERPL-MCNC: 4.1 MMOL/L — SIGNIFICANT CHANGE UP (ref 3.5–5.3)
POTASSIUM SERPL-SCNC: 4.1 MMOL/L — SIGNIFICANT CHANGE UP (ref 3.5–5.3)
PROT SERPL-MCNC: 7.8 G/DL — SIGNIFICANT CHANGE UP (ref 6.6–8.7)
PROTHROM AB SERPL-ACNC: 11.8 SEC — SIGNIFICANT CHANGE UP (ref 9.8–12.7)
RBC # BLD: 5.01 M/UL — SIGNIFICANT CHANGE UP (ref 4.4–5.2)
RBC # FLD: 16.6 % — HIGH (ref 11–15.6)
RBC BLD AUTO: ABNORMAL
SODIUM SERPL-SCNC: 138 MMOL/L — SIGNIFICANT CHANGE UP (ref 135–145)
VARIANT LYMPHS # BLD: 2 % — SIGNIFICANT CHANGE UP (ref 0–6)
WBC # BLD: 7.6 K/UL — SIGNIFICANT CHANGE UP (ref 4.8–10.8)
WBC # FLD AUTO: 7.6 K/UL — SIGNIFICANT CHANGE UP (ref 4.8–10.8)

## 2017-03-21 PROCEDURE — 93005 ELECTROCARDIOGRAM TRACING: CPT

## 2017-03-21 PROCEDURE — 93010 ELECTROCARDIOGRAM REPORT: CPT

## 2017-03-21 PROCEDURE — 85610 PROTHROMBIN TIME: CPT

## 2017-03-21 PROCEDURE — 82378 CARCINOEMBRYONIC ANTIGEN: CPT

## 2017-03-21 PROCEDURE — G0463: CPT

## 2017-03-21 PROCEDURE — 85730 THROMBOPLASTIN TIME PARTIAL: CPT

## 2017-03-21 PROCEDURE — 80053 COMPREHEN METABOLIC PANEL: CPT

## 2017-03-21 PROCEDURE — 83036 HEMOGLOBIN GLYCOSYLATED A1C: CPT

## 2017-03-21 PROCEDURE — 85027 COMPLETE CBC AUTOMATED: CPT

## 2017-03-21 RX ORDER — ALVIMOPAN 12 MG/1
12 CAPSULE ORAL ONCE
Qty: 0 | Refills: 0 | Status: COMPLETED | OUTPATIENT
Start: 2017-04-03 | End: 2017-04-03

## 2017-03-21 RX ORDER — SODIUM CHLORIDE 9 MG/ML
3 INJECTION INTRAMUSCULAR; INTRAVENOUS; SUBCUTANEOUS EVERY 8 HOURS
Qty: 0 | Refills: 0 | Status: DISCONTINUED | OUTPATIENT
Start: 2017-04-04 | End: 2017-04-07

## 2017-03-21 RX ORDER — HEPARIN SODIUM 5000 [USP'U]/ML
5000 INJECTION INTRAVENOUS; SUBCUTANEOUS ONCE
Qty: 0 | Refills: 0 | Status: COMPLETED | OUTPATIENT
Start: 2017-04-03 | End: 2017-04-03

## 2017-03-21 NOTE — PATIENT PROFILE ADULT. - MUTUALITY COMMENT, PROFILE
Bloodless medicine form discussed and given to pt daughter.  instructed to return on day of surgery along with helath Care proxy.

## 2017-03-21 NOTE — H&P PST ADULT - PMH
Borderline diabetes    Diverticulosis    Fracture  pelvis 10/2016  Glaucoma    Hemorrhoid    Hypertension    Mass of cecum    Shingles    Valvular disease

## 2017-03-21 NOTE — H&P PST ADULT - HISTORY OF PRESENT ILLNESS
89 yo female had rectal bleeding while on lovenox, colonoscopy revealed mass in cecum planning right hemicolectomy.

## 2017-03-21 NOTE — H&P PST ADULT - PROBLEM SELECTOR PLAN 1
Laparoscopic possible open right hemicolectomy, lymph node dissection, mobilization of splenic flexure, lysis of adhesions.

## 2017-03-21 NOTE — H&P PST ADULT - NSANTHOSAYNRD_GEN_A_CORE
No. GALEN screening performed.  STOP BANG Legend: 0-2 = LOW Risk; 3-4 = INTERMEDIATE Risk; 5-8 = HIGH Risk

## 2017-03-22 DIAGNOSIS — Z01.818 ENCOUNTER FOR OTHER PREPROCEDURAL EXAMINATION: ICD-10-CM

## 2017-03-22 DIAGNOSIS — K63.9 DISEASE OF INTESTINE, UNSPECIFIED: ICD-10-CM

## 2017-03-22 LAB — CEA SERPL-MCNC: 5.9 NG/ML — HIGH (ref 0–3.8)

## 2017-03-31 ENCOUNTER — OTHER (OUTPATIENT)
Age: 82
End: 2017-03-31

## 2017-04-02 ENCOUNTER — RESULT REVIEW (OUTPATIENT)
Age: 82
End: 2017-04-02

## 2017-04-02 RX ORDER — HEPARIN SODIUM 5000 [USP'U]/ML
5000 INJECTION INTRAVENOUS; SUBCUTANEOUS EVERY 12 HOURS
Qty: 0 | Refills: 0 | Status: DISCONTINUED | OUTPATIENT
Start: 2017-04-04 | End: 2017-04-07

## 2017-04-02 RX ORDER — SODIUM CHLORIDE 9 MG/ML
1000 INJECTION, SOLUTION INTRAVENOUS
Qty: 0 | Refills: 0 | Status: DISCONTINUED | OUTPATIENT
Start: 2017-04-04 | End: 2017-04-04

## 2017-04-02 RX ORDER — ONDANSETRON 8 MG/1
4 TABLET, FILM COATED ORAL EVERY 6 HOURS
Qty: 0 | Refills: 0 | Status: DISCONTINUED | OUTPATIENT
Start: 2017-04-04 | End: 2017-04-07

## 2017-04-02 RX ORDER — ALVIMOPAN 12 MG/1
12 CAPSULE ORAL
Qty: 0 | Refills: 0 | Status: DISCONTINUED | OUTPATIENT
Start: 2017-04-04 | End: 2017-04-07

## 2017-04-03 ENCOUNTER — APPOINTMENT (OUTPATIENT)
Dept: COLORECTAL SURGERY | Facility: HOSPITAL | Age: 82
End: 2017-04-03

## 2017-04-03 ENCOUNTER — INPATIENT (INPATIENT)
Facility: HOSPITAL | Age: 82
LOS: 3 days | Discharge: HOME | DRG: 331 | End: 2017-04-07
Attending: COLON & RECTAL SURGERY | Admitting: COLON & RECTAL SURGERY
Payer: MEDICARE

## 2017-04-03 VITALS
SYSTOLIC BLOOD PRESSURE: 160 MMHG | HEIGHT: 63 IN | DIASTOLIC BLOOD PRESSURE: 66 MMHG | OXYGEN SATURATION: 99 % | HEART RATE: 62 BPM | WEIGHT: 141.1 LBS | TEMPERATURE: 97 F | RESPIRATION RATE: 16 BRPM

## 2017-04-03 DIAGNOSIS — K63.9 DISEASE OF INTESTINE, UNSPECIFIED: ICD-10-CM

## 2017-04-03 PROCEDURE — 58660 LAPAROSCOPY LYSIS: CPT

## 2017-04-03 PROCEDURE — 44204 LAPARO PARTIAL COLECTOMY: CPT

## 2017-04-03 PROCEDURE — 44213 LAP MOBIL SPLENIC FL ADD-ON: CPT

## 2017-04-03 PROCEDURE — 58660 LAPAROSCOPY LYSIS: CPT | Mod: AS

## 2017-04-03 PROCEDURE — 88307 TISSUE EXAM BY PATHOLOGIST: CPT | Mod: 26

## 2017-04-03 PROCEDURE — 44213 LAP MOBIL SPLENIC FL ADD-ON: CPT | Mod: AS

## 2017-04-03 PROCEDURE — 88302 TISSUE EXAM BY PATHOLOGIST: CPT | Mod: 26

## 2017-04-03 PROCEDURE — 44204 LAPARO PARTIAL COLECTOMY: CPT | Mod: AS

## 2017-04-03 RX ORDER — GABAPENTIN 400 MG/1
0 CAPSULE ORAL
Qty: 0 | Refills: 0 | COMMUNITY

## 2017-04-03 RX ORDER — CEFOTETAN DISODIUM 1 G
2 VIAL (EA) INJECTION ONCE
Qty: 0 | Refills: 0 | Status: COMPLETED | OUTPATIENT
Start: 2017-04-03 | End: 2017-04-03

## 2017-04-03 RX ORDER — GABAPENTIN 400 MG/1
300 CAPSULE ORAL DAILY
Qty: 0 | Refills: 0 | Status: DISCONTINUED | OUTPATIENT
Start: 2017-04-03 | End: 2017-04-07

## 2017-04-03 RX ORDER — FELODIPINE 5 MG/1
1 TABLET, FILM COATED, EXTENDED RELEASE ORAL
Qty: 0 | Refills: 0 | COMMUNITY

## 2017-04-03 RX ORDER — OXYCODONE HYDROCHLORIDE 5 MG/1
5 TABLET ORAL EVERY 4 HOURS
Qty: 0 | Refills: 0 | Status: DISCONTINUED | OUTPATIENT
Start: 2017-04-04 | End: 2017-04-05

## 2017-04-03 RX ORDER — FENTANYL CITRATE 50 UG/ML
25 INJECTION INTRAVENOUS
Qty: 0 | Refills: 0 | Status: DISCONTINUED | OUTPATIENT
Start: 2017-04-03 | End: 2017-04-03

## 2017-04-03 RX ORDER — FERROUS SULFATE 325(65) MG
1 TABLET ORAL
Qty: 0 | Refills: 0 | COMMUNITY

## 2017-04-03 RX ORDER — LATANOPROST 0.05 MG/ML
1 SOLUTION/ DROPS OPHTHALMIC; TOPICAL AT BEDTIME
Qty: 0 | Refills: 0 | Status: DISCONTINUED | OUTPATIENT
Start: 2017-04-03 | End: 2017-04-07

## 2017-04-03 RX ORDER — HYDRALAZINE HCL 50 MG
0 TABLET ORAL
Qty: 0 | Refills: 0 | COMMUNITY

## 2017-04-03 RX ORDER — OXYCODONE HYDROCHLORIDE 5 MG/1
10 TABLET ORAL EVERY 4 HOURS
Qty: 0 | Refills: 0 | Status: DISCONTINUED | OUTPATIENT
Start: 2017-04-04 | End: 2017-04-05

## 2017-04-03 RX ORDER — HYDRALAZINE HCL 50 MG
10 TABLET ORAL EVERY 6 HOURS
Qty: 0 | Refills: 0 | Status: DISCONTINUED | OUTPATIENT
Start: 2017-04-03 | End: 2017-04-04

## 2017-04-03 RX ORDER — TIMOLOL 0.5 %
1 DROPS OPHTHALMIC (EYE) AT BEDTIME
Qty: 0 | Refills: 0 | Status: DISCONTINUED | OUTPATIENT
Start: 2017-04-03 | End: 2017-04-07

## 2017-04-03 RX ORDER — ONDANSETRON 8 MG/1
4 TABLET, FILM COATED ORAL ONCE
Qty: 0 | Refills: 0 | Status: DISCONTINUED | OUTPATIENT
Start: 2017-04-03 | End: 2017-04-04

## 2017-04-03 RX ORDER — SODIUM CHLORIDE 9 MG/ML
1000 INJECTION, SOLUTION INTRAVENOUS
Qty: 0 | Refills: 0 | Status: DISCONTINUED | OUTPATIENT
Start: 2017-04-03 | End: 2017-04-04

## 2017-04-03 RX ORDER — HYDRALAZINE HCL 50 MG
25 TABLET ORAL DAILY
Qty: 0 | Refills: 0 | Status: DISCONTINUED | OUTPATIENT
Start: 2017-04-03 | End: 2017-04-03

## 2017-04-03 RX ADMIN — FENTANYL CITRATE 25 MICROGRAM(S): 50 INJECTION INTRAVENOUS at 20:00

## 2017-04-03 RX ADMIN — FENTANYL CITRATE 25 MICROGRAM(S): 50 INJECTION INTRAVENOUS at 19:30

## 2017-04-03 RX ADMIN — FENTANYL CITRATE 25 MICROGRAM(S): 50 INJECTION INTRAVENOUS at 19:40

## 2017-04-03 RX ADMIN — FENTANYL CITRATE 25 MICROGRAM(S): 50 INJECTION INTRAVENOUS at 19:15

## 2017-04-03 RX ADMIN — FENTANYL CITRATE 25 MICROGRAM(S): 50 INJECTION INTRAVENOUS at 18:55

## 2017-04-03 RX ADMIN — FENTANYL CITRATE 25 MICROGRAM(S): 50 INJECTION INTRAVENOUS at 19:00

## 2017-04-03 RX ADMIN — FENTANYL CITRATE 25 MICROGRAM(S): 50 INJECTION INTRAVENOUS at 20:15

## 2017-04-03 RX ADMIN — HEPARIN SODIUM 5000 UNIT(S): 5000 INJECTION INTRAVENOUS; SUBCUTANEOUS at 13:25

## 2017-04-03 RX ADMIN — Medication 100 GRAM(S): at 17:31

## 2017-04-03 RX ADMIN — Medication 10 MILLIGRAM(S): at 22:03

## 2017-04-03 RX ADMIN — ALVIMOPAN 12 MILLIGRAM(S): 12 CAPSULE ORAL at 10:44

## 2017-04-03 NOTE — BRIEF OPERATIVE NOTE - PROCEDURE
Right hemicolectomy  04/03/2017    Active  MBERRONESJISHMAEL  Mobilization of hepatic flexure  04/03/2017    Active  MBERRONESJR  Robotic assistance in laparoscopic procedure  04/03/2017    Active  MBERRONESJR

## 2017-04-03 NOTE — PROGRESS NOTE ADULT - SUBJECTIVE AND OBJECTIVE BOX
HPI:  89 yo female had rectal bleeding while on lovenox, colonoscopy revealed mass in cecum post op right hemicolectomy    Home Medications:   * Incomplete Medication History as of 21-Mar-2017 10:54 documented in Prescription Writer  · 	Protonix 40 mg oral delayed release tablet: 1 tab(s) orally once a day  · 	Combigan 0.2%-0.5% ophthalmic solution: Last Dose Taken:  , 1 drop(s) to each affected eye every 12 hours  · 	Travatan 0.004% ophthalmic solution: 1 drop(s) to each affected eye once a day (in the evening)  · 	Catapres: Last Dose Taken:  , 0.3 milligram(s) transdermal once a week   · 	felodipine 10 mg oral tablet, extended release: Last Dose Taken:  , 1 tab(s) orally once a day  · 	hydrALAZINE 25 mg oral tablet: Last Dose Taken:  ,  orally once a day  · 	gabapentin 300 mg oral capsule: Last Dose Taken:  ,  orally once a day  · 	ferrous sulfate 325 mg (65 mg elemental iron) oral tablet: Last Dose Taken:  , 1 tab(s) orally 3 times a day    PAST MEDICAL & SURGICAL HISTORY:  Fracture: pelvis 10/2016  Valvular disease  Mass of cecum  Shingles  Borderline diabetes  Hemorrhoid  Diverticulosis  Hypertension  Glaucoma  H/O cataract extraction: b/l  Dementia Mild    No significant past surgical history    gabapentin 300milliGRAM(s) Oral daily  latanoprost 0.005% Ophthalmic Solution 1Drop(s) Both EYES at bedtime  hydrALAZINE 25milliGRAM(s) Oral daily  lactated ringers. 1000milliLiter(s) IV Continuous <Continuous>  fentaNYL    Injectable 25MICROGram(s) IV Push every 5 minutes PRN  ondansetron Injectable 4milliGRAM(s) IV Push once PRN    MEDICATIONS  (STANDING):  gabapentin 300milliGRAM(s) Oral daily  latanoprost 0.005% Ophthalmic Solution 1Drop(s) Both EYES at bedtime  hydrALAZINE 25milliGRAM(s) Oral daily  lactated ringers. 1000milliLiter(s) IV Continuous <Continuous>    MEDICATIONS  (PRN):  fentaNYL    Injectable 25MICROGram(s) IV Push every 5 minutes PRN Moderate Pain  ondansetron Injectable 4milliGRAM(s) IV Push once PRN Nausea and/or Vomiting      Allergies    aspirin (Other (Mild to Mod))  Tylenol (Other (Mild to Mod))      Intolerance  Cardizem (Faint)      SOCIAL HISTORY:  No S/D/ IVDU    FAMILY HISTORY:  No pertinent family history in first degree relatives        ROS  - Headache  - Neck Stiffness  - Chest Pain  - SOB  +mild  Abd pain  - Pelvic Pain  - Leg Pain  - Head Ache.        Vital Signs Last 24 Hrs  T(C): 36.8, Max: 36.8 (04-03 @ 18:57)  T(F): 98.2, Max: 98.2 (04-03 @ 18:57)  HR: 55 (47 - 62)  BP: 140/107 (108/93 - 176/61)  BP(mean): --  RR: 16 (14 - 20)  SpO2: 100% (91% - 100%)  HEENT: PEARLA  Neck: Supple  Cardio: S1 S2 SE  Murmur  Pulm: CTA No Rales or Ronchi  Abd: Soft NT ND BS dec, Midline incision  Rectal - refused  Ext: No DCT  Skin: No Rash  Neuro: Awake Short term loss      HTN - clonidine patch, Hydralazine, Norvasc  Dementia - may need constant supervision  Glaucoma  - cont drops      Spoke with Daughter

## 2017-04-04 ENCOUNTER — TRANSCRIPTION ENCOUNTER (OUTPATIENT)
Age: 82
End: 2017-04-04

## 2017-04-04 LAB
ANION GAP SERPL CALC-SCNC: 9 MMOL/L — SIGNIFICANT CHANGE UP (ref 5–17)
BUN SERPL-MCNC: 7 MG/DL — LOW (ref 8–20)
CALCIUM SERPL-MCNC: 9.9 MG/DL — SIGNIFICANT CHANGE UP (ref 8.6–10.2)
CHLORIDE SERPL-SCNC: 98 MMOL/L — SIGNIFICANT CHANGE UP (ref 98–107)
CO2 SERPL-SCNC: 28 MMOL/L — SIGNIFICANT CHANGE UP (ref 22–29)
CREAT SERPL-MCNC: 0.57 MG/DL — SIGNIFICANT CHANGE UP (ref 0.5–1.3)
GLUCOSE SERPL-MCNC: 126 MG/DL — HIGH (ref 70–115)
HCT VFR BLD CALC: 46.3 % — SIGNIFICANT CHANGE UP (ref 37–47)
HGB BLD-MCNC: 14.8 G/DL — SIGNIFICANT CHANGE UP (ref 12–16)
LYMPHOCYTES # BLD AUTO: 0.6 K/UL — LOW (ref 1–4.8)
LYMPHOCYTES # BLD AUTO: 6 % — LOW (ref 20–55)
MAGNESIUM SERPL-MCNC: 2.2 MG/DL — SIGNIFICANT CHANGE UP (ref 1.8–2.5)
MCHC RBC-ENTMCNC: 28.8 PG — SIGNIFICANT CHANGE UP (ref 27–31)
MCHC RBC-ENTMCNC: 32 G/DL — SIGNIFICANT CHANGE UP (ref 32–36)
MCV RBC AUTO: 90.3 FL — SIGNIFICANT CHANGE UP (ref 81–99)
MONOCYTES # BLD AUTO: 1.5 K/UL — HIGH (ref 0–0.8)
MONOCYTES NFR BLD AUTO: 11 % — HIGH (ref 3–10)
NEUTROPHILS # BLD AUTO: 11.7 K/UL — HIGH (ref 1.8–8)
NEUTROPHILS NFR BLD AUTO: 80 % — HIGH (ref 37–73)
NEUTS BAND # BLD: 3 % — SIGNIFICANT CHANGE UP (ref 0–8)
PHOSPHATE SERPL-MCNC: 2.6 MG/DL — SIGNIFICANT CHANGE UP (ref 2.4–4.7)
PLAT MORPH BLD: NORMAL — SIGNIFICANT CHANGE UP
PLATELET # BLD AUTO: 218 K/UL — SIGNIFICANT CHANGE UP (ref 150–400)
POTASSIUM SERPL-MCNC: 4.1 MMOL/L — SIGNIFICANT CHANGE UP (ref 3.5–5.3)
POTASSIUM SERPL-SCNC: 4.1 MMOL/L — SIGNIFICANT CHANGE UP (ref 3.5–5.3)
RBC # BLD: 5.13 M/UL — SIGNIFICANT CHANGE UP (ref 4.4–5.2)
RBC # FLD: 15.6 % — SIGNIFICANT CHANGE UP (ref 11–15.6)
RBC BLD AUTO: NORMAL — SIGNIFICANT CHANGE UP
SODIUM SERPL-SCNC: 135 MMOL/L — SIGNIFICANT CHANGE UP (ref 135–145)
WBC # BLD: 13.9 K/UL — HIGH (ref 4.8–10.8)
WBC # FLD AUTO: 13.9 K/UL — HIGH (ref 4.8–10.8)

## 2017-04-04 RX ORDER — HYDROMORPHONE HYDROCHLORIDE 2 MG/ML
0.5 INJECTION INTRAMUSCULAR; INTRAVENOUS; SUBCUTANEOUS ONCE
Qty: 0 | Refills: 0 | Status: DISCONTINUED | OUTPATIENT
Start: 2017-04-04 | End: 2017-04-04

## 2017-04-04 RX ORDER — SODIUM CHLORIDE 9 MG/ML
1000 INJECTION, SOLUTION INTRAVENOUS
Qty: 0 | Refills: 0 | Status: DISCONTINUED | OUTPATIENT
Start: 2017-04-04 | End: 2017-04-06

## 2017-04-04 RX ORDER — AMLODIPINE BESYLATE 2.5 MG/1
10 TABLET ORAL DAILY
Qty: 0 | Refills: 0 | Status: DISCONTINUED | OUTPATIENT
Start: 2017-04-04 | End: 2017-04-07

## 2017-04-04 RX ORDER — CEFOTETAN DISODIUM 1 G
2 VIAL (EA) INJECTION ONCE
Qty: 0 | Refills: 0 | Status: COMPLETED | OUTPATIENT
Start: 2017-04-04 | End: 2017-04-04

## 2017-04-04 RX ORDER — HYDRALAZINE HCL 50 MG
20 TABLET ORAL EVERY 6 HOURS
Qty: 0 | Refills: 0 | Status: DISCONTINUED | OUTPATIENT
Start: 2017-04-04 | End: 2017-04-07

## 2017-04-04 RX ADMIN — Medication 10 MILLIGRAM(S): at 06:10

## 2017-04-04 RX ADMIN — OXYCODONE HYDROCHLORIDE 10 MILLIGRAM(S): 5 TABLET ORAL at 10:42

## 2017-04-04 RX ADMIN — SODIUM CHLORIDE 3 MILLILITER(S): 9 INJECTION INTRAMUSCULAR; INTRAVENOUS; SUBCUTANEOUS at 13:15

## 2017-04-04 RX ADMIN — HYDROMORPHONE HYDROCHLORIDE 0.5 MILLIGRAM(S): 2 INJECTION INTRAMUSCULAR; INTRAVENOUS; SUBCUTANEOUS at 12:50

## 2017-04-04 RX ADMIN — Medication 1 DROP(S): at 23:30

## 2017-04-04 RX ADMIN — HYDROMORPHONE HYDROCHLORIDE 0.5 MILLIGRAM(S): 2 INJECTION INTRAMUSCULAR; INTRAVENOUS; SUBCUTANEOUS at 13:15

## 2017-04-04 RX ADMIN — SODIUM CHLORIDE 3 MILLILITER(S): 9 INJECTION INTRAMUSCULAR; INTRAVENOUS; SUBCUTANEOUS at 05:33

## 2017-04-04 RX ADMIN — ALVIMOPAN 12 MILLIGRAM(S): 12 CAPSULE ORAL at 05:31

## 2017-04-04 RX ADMIN — OXYCODONE HYDROCHLORIDE 10 MILLIGRAM(S): 5 TABLET ORAL at 06:30

## 2017-04-04 RX ADMIN — GABAPENTIN 300 MILLIGRAM(S): 400 CAPSULE ORAL at 13:29

## 2017-04-04 RX ADMIN — Medication 1 PATCH: at 22:14

## 2017-04-04 RX ADMIN — LATANOPROST 1 DROP(S): 0.05 SOLUTION/ DROPS OPHTHALMIC; TOPICAL at 23:30

## 2017-04-04 RX ADMIN — OXYCODONE HYDROCHLORIDE 10 MILLIGRAM(S): 5 TABLET ORAL at 11:39

## 2017-04-04 RX ADMIN — SODIUM CHLORIDE 3 MILLILITER(S): 9 INJECTION INTRAMUSCULAR; INTRAVENOUS; SUBCUTANEOUS at 23:27

## 2017-04-04 RX ADMIN — ONDANSETRON 4 MILLIGRAM(S): 8 TABLET, FILM COATED ORAL at 22:31

## 2017-04-04 RX ADMIN — OXYCODONE HYDROCHLORIDE 10 MILLIGRAM(S): 5 TABLET ORAL at 01:13

## 2017-04-04 RX ADMIN — Medication 100 GRAM(S): at 05:31

## 2017-04-04 RX ADMIN — Medication 10 MILLIGRAM(S): at 15:19

## 2017-04-04 RX ADMIN — HEPARIN SODIUM 5000 UNIT(S): 5000 INJECTION INTRAVENOUS; SUBCUTANEOUS at 05:31

## 2017-04-04 RX ADMIN — HEPARIN SODIUM 5000 UNIT(S): 5000 INJECTION INTRAVENOUS; SUBCUTANEOUS at 18:06

## 2017-04-04 RX ADMIN — OXYCODONE HYDROCHLORIDE 10 MILLIGRAM(S): 5 TABLET ORAL at 00:21

## 2017-04-04 RX ADMIN — OXYCODONE HYDROCHLORIDE 10 MILLIGRAM(S): 5 TABLET ORAL at 05:31

## 2017-04-04 RX ADMIN — Medication 20 MILLIGRAM(S): at 20:04

## 2017-04-04 NOTE — PROGRESS NOTE ADULT - SUBJECTIVE AND OBJECTIVE BOX
INTERVAL HPI/OVERNIGHT EVENTS: 89 yo female s/p laparoscopic robotic assisted right hemicolectomy. Patient seen and evaluated with attending this am, doing well with complaints of sore throat and abdominal pain. No other complaints at this time.     STATUS POST:  Lap robotic assisted R hemicolectomy     POST OPERATIVE DAY #: 1    SUBJECTIVE:  Flatus: [ ] YES [ x] NO             Bowel Movement: [ ] YES [x ] NO  Pain (0-10):            Pain Control Adequate: [x ] YES [ ] NO  Nausea: [ ] YES [x ] NO            Vomiting: [ ] YES [x ] NO  Diarrhea: [ ] YES [x ] NO         Chest Pain: [ ] YES [x ] NO    SOB:  [ ] YES [ x] NO    MEDICATIONS  (STANDING):  sodium chloride 0.9% lock flush 3milliLiter(s) IV Push every 8 hours  heparin  Injectable 5000Unit(s) SubCutaneous every 12 hours  lactated ringers. 1000milliLiter(s) IV Continuous <Continuous>  alvimopan 12milliGRAM(s) Oral two times a day  gabapentin 300milliGRAM(s) Oral daily  latanoprost 0.005% Ophthalmic Solution 1Drop(s) Both EYES at bedtime  timolol 0.25% Solution 1Drop(s) Both EYES at bedtime    MEDICATIONS  (PRN):  ondansetron Injectable 4milliGRAM(s) IV Push every 6 hours PRN Nausea and/or Vomiting  oxyCODONE IR 5milliGRAM(s) Oral every 4 hours PRN Mild Pain (1 - 3)  oxyCODONE IR 10milliGRAM(s) Oral every 4 hours PRN Moderate Pain (4 - 6)  hydrALAZINE Injectable 10milliGRAM(s) IV Push every 6 hours PRN sbp > 160      Vital Signs Last 24 Hrs  T(C): 37.1, Max: 37.1 (04-04 @ 08:45)  T(F): 98.7, Max: 98.7 (04-04 @ 08:45)  HR: 83 (47 - 84)  BP: 168/61 (108/93 - 183/75)  BP(mean): --  RR: 18 (14 - 20)  SpO2: 94% (91% - 100%)    PHYSICAL EXAM:      Constitutional: A&O, in NAD, laying comfortably in bed.     Eyes: EOMI, PERRL     Respiratory: CTA, bilaterally. Breathing comfortably on supplemental O2    Cardiovascular: RRR, +S1S2.     Gastrointestinal: Abdomen with appropriate pain to palpation, soft, ND.     Genitourinary: López removed this am, awaiting TOV.     Skin: Warm, dry.           I&O's Detail    I & Os for current day (as of 04 Apr 2017 10:06)  =============================================  IN:    lactated ringers.: 250 ml    Total IN: 250 ml  ---------------------------------------------  OUT:    Indwelling Catheter - Urethral: 1150 ml    Total OUT: 1150 ml  ---------------------------------------------  Total NET: -900 ml      LABS:                RADIOLOGY & ADDITIONAL STUDIES:

## 2017-04-04 NOTE — PROGRESS NOTE ADULT - SUBJECTIVE AND OBJECTIVE BOX
HPI:  89 yo female had rectal bleeding while on lovenox, colonoscopy revealed mass in cecum planning right hemicolectomy. (21 Mar 2017 10:50)     Allergies    aspirin (Other (Mild to Mod))  Tylenol (Other (Mild to Mod))    Intolerances    Cardizem (Faint)    Fracture  Valvular disease  Mass of cecum  Shingles  Borderline diabetes  Hemorrhoid  Diverticulosis  Hypertension  Glaucoma    MEDICATIONS  (STANDING):  sodium chloride 0.9% lock flush 3milliLiter(s) IV Push every 8 hours  heparin  Injectable 5000Unit(s) SubCutaneous every 12 hours  alvimopan 12milliGRAM(s) Oral two times a day  gabapentin 300milliGRAM(s) Oral daily  latanoprost 0.005% Ophthalmic Solution 1Drop(s) Both EYES at bedtime  timolol 0.25% Solution 1Drop(s) Both EYES at bedtime  cloNIDine Patch 0.2 mG/24Hr(s) 1patch Topical every 7 days    MEDICATIONS  (PRN):  ondansetron Injectable 4milliGRAM(s) IV Push every 6 hours PRN Nausea and/or Vomiting  oxyCODONE IR 5milliGRAM(s) Oral every 4 hours PRN Mild Pain (1 - 3)  oxyCODONE IR 10milliGRAM(s) Oral every 4 hours PRN Moderate Pain (4 - 6)  hydrALAZINE Injectable 10milliGRAM(s) IV Push every 6 hours PRN sbp > 160                           14.8   13.9  )-----------( 218      ( 04 Apr 2017 10:46 )             46.3     04 Apr 2017 10:46    135    |  98     |  7.0    ----------------------------<  126    4.1     |  28.0   |  0.57     Ca    9.9        04 Apr 2017 10:46  Phos  2.6       04 Apr 2017 10:46  Mg     2.2       04 Apr 2017 10:46        ;  Vital Signs Last 24 Hrs  T(C): 36.8, Max: 37.1 (04-04 @ 08:45)  T(F): 98.2, Max: 98.7 (04-04 @ 08:45)  HR: 88 (64 - 88)  BP: 195/84 (155/70 - 195/84)  BP(mean): --  RR: 17 (16 - 19)  SpO2: 92% (91% - 100%)  CAPILLARY BLOOD GLUCOSE  98 (04 Apr 2017 17:40)  120 (04 Apr 2017 12:20)  122 (04 Apr 2017 05:24)    I & Os for 24h ending 04-04 @ 07:00  =============================================  IN: 250 ml / OUT: 1150 ml / NET: -900 ml    I & Os for current day (as of 04-04 @ 19:19)  =============================================  IN: 0 ml / OUT: 120 ml / NET: -120 ml    Patient appears comfortable No CP, No SOB, No N/V  HEENT: PEARLA  Neck: Supple  Cardio: S1 S2 SE  Murmur  Pulm: CTA No Rales or Ronchi  Abd: Soft NT ND BS dec, Midline incision  Rectal - refused  Ext: No DCT  Skin: No Rash  Neuro: Awake Short term loss Non verbal      HTN - clonidine patch, Hydralazine, Norvasc  Dementia - daughter at bedside   Glaucoma  - cont drops  Urinary Retention - hunter placed TOV in 2 days

## 2017-04-04 NOTE — PHYSICAL THERAPY INITIAL EVALUATION ADULT - GENERAL OBSERVATIONS, REHAB EVAL
Pt rec'd in room sitting upright on bedside commode breathing 02 via NC, daughter present and RN at bedside

## 2017-04-04 NOTE — PROGRESS NOTE ADULT - SUBJECTIVE AND OBJECTIVE BOX
SURGICAL PA NOTE:     STATUS POST:  Lap. robotic assisted right hemicolectomy    POST OPERATIVE DAY #: post op check    Vital Signs Last 24 Hrs  T(C): 36.6, Max: 36.8 (04-03 @ 18:57)  T(F): 97.9, Max: 98.2 (04-03 @ 18:57)  HR: 75 (47 - 75)  BP: 155/70 (108/93 - 183/75)  BP(mean): --  RR: 18 (14 - 20)  SpO2: 98% (91% - 100%)    HPI:  89 yo female had rectal bleeding while on lovenox, colonoscopy revealed mass in cecum planning right hemicolectomy. (21 Mar 2017 10:50)      Disorder of intestine  No h/o HF  No pertinent family history in first degree relatives  MEWS Score  Fracture  Valvular disease  Mass of cecum  Shingles  Borderline diabetes  Hemorrhoid  Diverticulosis  Hypertension  Glaucoma  Polyp of ascending colon, unspecified type  Polyp of ascending colon, unspecified type  Valvular disease  Hypertension  Mass of cecum  Robotic assistance in laparoscopic procedure  Mobilization of hepatic flexure  Right hemicolectomy  H/O cataract extraction  No significant past surgical history  DISEASE OF INTESTINE, UNSPECIF      SUBJECTIVE: Pt seen lying supine with HOB up,     SOB:  [ ] YES [x ] NO  Dyspnea: [ ]YES [x ]NO  Chest Discomfort: [ ] YES [x ] NO    Nausea: [ ] YES [x ] NO           Vomiting: [ ] YES [ x] NO  Flatus: [ ] YES [x ] NO             Bowel Movement: [ ] YES [ x] NO  Diarrhea: [ ] YES [x ] NO         Void: [ ]YES [ ]No  Constipation: [ ] YES [x ] NO     Pain (0-10):      3        Pain Control Adequate: [x] YES [ ] NO  López: indwelling  NGT:      I&O's Detail    I & Os for current day (as of 04 Apr 2017 01:37)  =============================================  IN:    lactated ringers.: 250 ml    Total IN: 250 ml  ---------------------------------------------  OUT:    Indwelling Catheter - Urethral: 625 ml    Total OUT: 625 ml  ---------------------------------------------  Total NET: -375 ml    I&O's Summary    I & Os for current day (as of 04 Apr 2017 01:37)  =============================================  IN: 250 ml / OUT: 625 ml / NET: -375 ml    I&O's Detail    I & Os for current day (as of 04 Apr 2017 01:37)  =============================================  IN:    lactated ringers.: 250 ml    Total IN: 250 ml  ---------------------------------------------  OUT:    Indwelling Catheter - Urethral: 625 ml    Total OUT: 625 ml  ---------------------------------------------  Total NET: -375 ml      MEDICATIONS  (STANDING):  sodium chloride 0.9% lock flush 3milliLiter(s) IV Push every 8 hours  heparin  Injectable 5000Unit(s) SubCutaneous every 12 hours  lactated ringers. 1000milliLiter(s) IV Continuous <Continuous>  alvimopan 12milliGRAM(s) Oral two times a day  cefoTEtan  IVPB 2Gram(s) IV Intermittent once  gabapentin 300milliGRAM(s) Oral daily  latanoprost 0.005% Ophthalmic Solution 1Drop(s) Both EYES at bedtime  timolol 0.25% Solution 1Drop(s) Both EYES at bedtime    MEDICATIONS  (PRN):  ondansetron Injectable 4milliGRAM(s) IV Push every 6 hours PRN Nausea and/or Vomiting  oxyCODONE IR 5milliGRAM(s) Oral every 4 hours PRN Mild Pain (1 - 3)  oxyCODONE IR 10milliGRAM(s) Oral every 4 hours PRN Moderate Pain (4 - 6)  hydrALAZINE Injectable 10milliGRAM(s) IV Push every 6 hours PRN sbp > 160      LABS:                  RADIOLOGY & ADDITIONAL STUDIES:

## 2017-04-04 NOTE — PHYSICAL THERAPY INITIAL EVALUATION ADULT - ADDITIONAL COMMENTS
Pt lives at home with her adult daughter, owns a RW and uses it for ambulation as needed. Per daughter, there is one step to enter, and one into the patients living area.

## 2017-04-04 NOTE — PHYSICAL THERAPY INITIAL EVALUATION ADULT - PLANNED THERAPY INTERVENTIONS, PT EVAL
strengthening/gait training/ROM/motor coordination training/balance training/stretching/transfer training

## 2017-04-04 NOTE — PROGRESS NOTE ADULT - SUBJECTIVE AND OBJECTIVE BOX
pt pod 1 sp robotic lap right hemicolectomy under GETA. Tolerated well. Denies any A/c.   pt with no n/v @ this time. using pain meds as ordered/needed with some pain relief. vss. spont vent. no issues with anesthesia at this time. pt resting comfortably @ this time.

## 2017-04-05 LAB
ANION GAP SERPL CALC-SCNC: 7 MMOL/L — SIGNIFICANT CHANGE UP (ref 5–17)
BUN SERPL-MCNC: 9 MG/DL — SIGNIFICANT CHANGE UP (ref 8–20)
CALCIUM SERPL-MCNC: 10.1 MG/DL — SIGNIFICANT CHANGE UP (ref 8.6–10.2)
CHLORIDE SERPL-SCNC: 100 MMOL/L — SIGNIFICANT CHANGE UP (ref 98–107)
CO2 SERPL-SCNC: 29 MMOL/L — SIGNIFICANT CHANGE UP (ref 22–29)
CREAT SERPL-MCNC: 0.44 MG/DL — LOW (ref 0.5–1.3)
GLUCOSE SERPL-MCNC: 102 MG/DL — SIGNIFICANT CHANGE UP (ref 70–115)
HCT VFR BLD CALC: 46 % — SIGNIFICANT CHANGE UP (ref 37–47)
HGB BLD-MCNC: 14.5 G/DL — SIGNIFICANT CHANGE UP (ref 12–16)
LYMPHOCYTES # BLD AUTO: 0.3 K/UL — LOW (ref 1–4.8)
LYMPHOCYTES # BLD AUTO: 3 % — LOW (ref 20–55)
MAGNESIUM SERPL-MCNC: 2.1 MG/DL — SIGNIFICANT CHANGE UP (ref 1.8–2.5)
MCHC RBC-ENTMCNC: 28.4 PG — SIGNIFICANT CHANGE UP (ref 27–31)
MCHC RBC-ENTMCNC: 31.5 G/DL — LOW (ref 32–36)
MCV RBC AUTO: 90.2 FL — SIGNIFICANT CHANGE UP (ref 81–99)
MONOCYTES # BLD AUTO: 1.2 K/UL — HIGH (ref 0–0.8)
MONOCYTES NFR BLD AUTO: 10 % — SIGNIFICANT CHANGE UP (ref 3–10)
NEUTROPHILS # BLD AUTO: 11.6 K/UL — HIGH (ref 1.8–8)
NEUTROPHILS NFR BLD AUTO: 84 % — HIGH (ref 37–73)
NEUTS BAND # BLD: 3 % — SIGNIFICANT CHANGE UP (ref 0–8)
PHOSPHATE SERPL-MCNC: 1.9 MG/DL — LOW (ref 2.4–4.7)
PLAT MORPH BLD: NORMAL — SIGNIFICANT CHANGE UP
PLATELET # BLD AUTO: 214 K/UL — SIGNIFICANT CHANGE UP (ref 150–400)
POTASSIUM SERPL-MCNC: 3.4 MMOL/L — LOW (ref 3.5–5.3)
POTASSIUM SERPL-SCNC: 3.4 MMOL/L — LOW (ref 3.5–5.3)
RBC # BLD: 5.1 M/UL — SIGNIFICANT CHANGE UP (ref 4.4–5.2)
RBC # FLD: 15.6 % — SIGNIFICANT CHANGE UP (ref 11–15.6)
RBC BLD AUTO: NORMAL — SIGNIFICANT CHANGE UP
SODIUM SERPL-SCNC: 136 MMOL/L — SIGNIFICANT CHANGE UP (ref 135–145)
WBC # BLD: 13.3 K/UL — HIGH (ref 4.8–10.8)
WBC # FLD AUTO: 13.3 K/UL — HIGH (ref 4.8–10.8)

## 2017-04-05 RX ORDER — HYDROMORPHONE HYDROCHLORIDE 2 MG/ML
0.25 INJECTION INTRAMUSCULAR; INTRAVENOUS; SUBCUTANEOUS EVERY 4 HOURS
Qty: 0 | Refills: 0 | Status: DISCONTINUED | OUTPATIENT
Start: 2017-04-05 | End: 2017-04-06

## 2017-04-05 RX ORDER — TRAMADOL HYDROCHLORIDE 50 MG/1
25 TABLET ORAL EVERY 6 HOURS
Qty: 0 | Refills: 0 | Status: DISCONTINUED | OUTPATIENT
Start: 2017-04-05 | End: 2017-04-06

## 2017-04-05 RX ORDER — ACETAMINOPHEN 500 MG
1000 TABLET ORAL ONCE
Qty: 0 | Refills: 0 | Status: COMPLETED | OUTPATIENT
Start: 2017-04-05 | End: 2017-04-05

## 2017-04-05 RX ORDER — KETOROLAC TROMETHAMINE 30 MG/ML
15 SYRINGE (ML) INJECTION ONCE
Qty: 0 | Refills: 0 | Status: DISCONTINUED | OUTPATIENT
Start: 2017-04-05 | End: 2017-04-05

## 2017-04-05 RX ORDER — PANTOPRAZOLE SODIUM 20 MG/1
40 TABLET, DELAYED RELEASE ORAL DAILY
Qty: 0 | Refills: 0 | Status: DISCONTINUED | OUTPATIENT
Start: 2017-04-05 | End: 2017-04-07

## 2017-04-05 RX ADMIN — PANTOPRAZOLE SODIUM 40 MILLIGRAM(S): 20 TABLET, DELAYED RELEASE ORAL at 13:01

## 2017-04-05 RX ADMIN — SODIUM CHLORIDE 3 MILLILITER(S): 9 INJECTION INTRAMUSCULAR; INTRAVENOUS; SUBCUTANEOUS at 06:44

## 2017-04-05 RX ADMIN — LATANOPROST 1 DROP(S): 0.05 SOLUTION/ DROPS OPHTHALMIC; TOPICAL at 21:06

## 2017-04-05 RX ADMIN — SODIUM CHLORIDE 3 MILLILITER(S): 9 INJECTION INTRAMUSCULAR; INTRAVENOUS; SUBCUTANEOUS at 11:58

## 2017-04-05 RX ADMIN — ONDANSETRON 4 MILLIGRAM(S): 8 TABLET, FILM COATED ORAL at 05:46

## 2017-04-05 RX ADMIN — Medication 20 MILLIGRAM(S): at 11:55

## 2017-04-05 RX ADMIN — Medication 20 MILLIGRAM(S): at 05:41

## 2017-04-05 RX ADMIN — Medication 15 MILLIGRAM(S): at 16:00

## 2017-04-05 RX ADMIN — Medication 15 MILLIGRAM(S): at 15:26

## 2017-04-05 RX ADMIN — HEPARIN SODIUM 5000 UNIT(S): 5000 INJECTION INTRAVENOUS; SUBCUTANEOUS at 06:46

## 2017-04-05 RX ADMIN — HEPARIN SODIUM 5000 UNIT(S): 5000 INJECTION INTRAVENOUS; SUBCUTANEOUS at 17:08

## 2017-04-05 RX ADMIN — Medication 20 MILLIGRAM(S): at 17:44

## 2017-04-05 RX ADMIN — Medication 1000 MILLIGRAM(S): at 15:00

## 2017-04-05 RX ADMIN — Medication 1 DROP(S): at 21:06

## 2017-04-05 RX ADMIN — Medication 400 MILLIGRAM(S): at 14:22

## 2017-04-05 RX ADMIN — SODIUM CHLORIDE 100 MILLILITER(S): 9 INJECTION, SOLUTION INTRAVENOUS at 11:56

## 2017-04-05 RX ADMIN — SODIUM CHLORIDE 3 MILLILITER(S): 9 INJECTION INTRAMUSCULAR; INTRAVENOUS; SUBCUTANEOUS at 21:07

## 2017-04-05 NOTE — PROGRESS NOTE ADULT - SUBJECTIVE AND OBJECTIVE BOX
seen and examined with Dr. Craven    Hospital Day #  POD # 2  s/p robotic assisted right jd-colectomy  IV: LR @ 100cc/hr  diet: clear liquids  Patient: afebrile VSS awake and responsive lying  in bed appears comfortable , no acute distress.  family member states little wiped out by pain medication.  And has been dry heaving   T(C): 36.4, Max: 37.1 (04-04 @ 08:45)  HR: 105 (83 - 105)  BP: 143/61 (143/61 - 195/84)  RR: 18 (16 - 18)  SpO2: 96% (92% - 96%)  Wt(kg): --  chest: fair air exchange, no apparent SOB, nasal O2 in place keeps trying to take it off.  Abdomen:  soft mild surgical incisional discomfort, hypo-active BS, surgical site  provena in place.  denies flatus, or bm yet  output: hunter catheter reinserted, adequate output.  Extremities: warm to toes with out calf pain or tenderness- get OOB today                          14.8   13.9  )-----------( 218      ( 04 Apr 2017 10:46 )             46.3       04 Apr 2017 10:46    135    |  98     |  7.0    ----------------------------<  126    4.1     |  28.0   |  0.57     Ca    9.9        04 Apr 2017 10:46  Phos  2.6       04 Apr 2017 10:46  Mg     2.2       04 Apr 2017 10:46            xrays:    PAST MEDICAL & SURGICAL HISTORY:  Fracture: pelvis 10/2016  Valvular disease  Mass of cecum  Shingles  Borderline diabetes  Hemorrhoid  Diverticulosis  Hypertension  Glaucoma  H/O cataract extraction: b/l  No significant past surgical history      Impression: stable POD # 2, stable no acute changes, surgical site stable, taking clear liquids ( continue and follow ) ,   Plan: continue present care and management, continue clear liquids, keep hunter ; trial tomorrow am, change pain management, follow dressings, encourage OOB today. repeat labs am

## 2017-04-05 NOTE — PROGRESS NOTE ADULT - SUBJECTIVE AND OBJECTIVE BOX
HPI:  89 yo female had rectal bleeding while on lovenox, colonoscopy revealed mass in cecum planning right hemicolectomy. (21 Mar 2017 10:50)     Allergies    aspirin (Other (Mild to Mod))    Intolerances    Cardizem (Faint)  Tylenol (Other)    Fracture  Valvular disease  Mass of cecum  Shingles  Borderline diabetes  Hemorrhoid  Diverticulosis  Hypertension  Glaucoma    MEDICATIONS  (STANDING):  sodium chloride 0.9% lock flush 3milliLiter(s) IV Push every 8 hours  heparin  Injectable 5000Unit(s) SubCutaneous every 12 hours  alvimopan 12milliGRAM(s) Oral two times a day  gabapentin 300milliGRAM(s) Oral daily  latanoprost 0.005% Ophthalmic Solution 1Drop(s) Both EYES at bedtime  timolol 0.25% Solution 1Drop(s) Both EYES at bedtime  cloNIDine Patch 0.2 mG/24Hr(s) 1patch Topical every 7 days  amLODIPine   Tablet 10milliGRAM(s) Oral daily  lactated ringers. 1000milliLiter(s) IV Continuous <Continuous>  pantoprazole  Injectable 40milliGRAM(s) IV Push daily    MEDICATIONS  (PRN):  ondansetron Injectable 4milliGRAM(s) IV Push every 6 hours PRN Nausea and/or Vomiting  hydrALAZINE Injectable 20milliGRAM(s) IV Push every 6 hours PRN sbp > 160  traMADol 25milliGRAM(s) Oral every 6 hours PRN Moderate Pain (4 - 6)  HYDROmorphone  Injectable 0.25milliGRAM(s) IV Push every 4 hours PRN Moderate Pain (4 - 6)                           14.5   13.3  )-----------( 214      ( 05 Apr 2017 09:37 )             46.0     04-05    136  |  100  |  9.0  ----------------------------<  102  3.4<L>   |  29.0  |  0.44<L>    Ca    10.1      05 Apr 2017 09:37  Phos  1.9     04-05  Mg     2.1     04-05        ;  Vital Signs Last 24 Hrs  T(C): 36.7, Max: 36.7 (04-04 @ 19:45)  T(F): 98, Max: 98 (04-04 @ 19:45)  HR: 108 (97 - 109)  BP: 168/74 (143/61 - 191/90)  BP(mean): --  RR: 19 (16 - 20)  SpO2: 94% (91% - 96%)  CAPILLARY BLOOD GLUCOSE  80 (05 Apr 2017 11:50)  88 (05 Apr 2017 07:15)  90 (05 Apr 2017 01:19)  98 (04 Apr 2017 17:40)    I & Os for 24h ending 04-05 @ 07:00  =============================================  IN: 1000 ml / OUT: 920 ml / NET: 80 ml    I & Os for current day (as of 04-05 @ 17:27)  =============================================  IN: 800 ml / OUT: 300 ml / NET: 500 ml    Patient appears comfortable No CP, No SOB, No N/V Poor Po intake    HEENT: PEARLA  Neck: Supple  Cardio: S1 S2 SE  Murmur  Pulm: CTA No Rales or Ronchi  Abd: Soft NT ND BS dec, Midline incision  Rectal - refused  Ext: No DCT  Skin: No Rash  Neuro: Awake Non verbal      HTN - clonidine patch, Hydralazine, Norvasc  Dementia - daughter at bedside   Glaucoma  - cont drops  Urinary Retention - hunter placed TOV in 2 days  Anorexia - daughter encourage to feed patient she understands increase in postop complications including infection, Decubs, dehiscence and death

## 2017-04-06 LAB
ANION GAP SERPL CALC-SCNC: 7 MMOL/L — SIGNIFICANT CHANGE UP (ref 5–17)
BUN SERPL-MCNC: 11 MG/DL — SIGNIFICANT CHANGE UP (ref 8–20)
CALCIUM SERPL-MCNC: 10 MG/DL — SIGNIFICANT CHANGE UP (ref 8.6–10.2)
CHLORIDE SERPL-SCNC: 100 MMOL/L — SIGNIFICANT CHANGE UP (ref 98–107)
CO2 SERPL-SCNC: 31 MMOL/L — HIGH (ref 22–29)
CREAT SERPL-MCNC: 0.44 MG/DL — LOW (ref 0.5–1.3)
GLUCOSE SERPL-MCNC: 86 MG/DL — SIGNIFICANT CHANGE UP (ref 70–115)
HCT VFR BLD CALC: 42.6 % — SIGNIFICANT CHANGE UP (ref 37–47)
HGB BLD-MCNC: 13.4 G/DL — SIGNIFICANT CHANGE UP (ref 12–16)
LYMPHOCYTES # BLD AUTO: 0.3 K/UL — LOW (ref 1–4.8)
LYMPHOCYTES # BLD AUTO: 3.8 % — LOW (ref 20–55)
MAGNESIUM SERPL-MCNC: 2.2 MG/DL — SIGNIFICANT CHANGE UP (ref 1.8–2.5)
MCHC RBC-ENTMCNC: 28.3 PG — SIGNIFICANT CHANGE UP (ref 27–31)
MCHC RBC-ENTMCNC: 31.5 G/DL — LOW (ref 32–36)
MCV RBC AUTO: 90.1 FL — SIGNIFICANT CHANGE UP (ref 81–99)
MONOCYTES # BLD AUTO: 1 K/UL — HIGH (ref 0–0.8)
MONOCYTES NFR BLD AUTO: 12 % — HIGH (ref 3–10)
NEUTROPHILS # BLD AUTO: 6.7 K/UL — SIGNIFICANT CHANGE UP (ref 1.8–8)
NEUTROPHILS NFR BLD AUTO: 84.1 % — HIGH (ref 37–73)
PHOSPHATE SERPL-MCNC: 1.3 MG/DL — LOW (ref 2.4–4.7)
PLATELET # BLD AUTO: 198 K/UL — SIGNIFICANT CHANGE UP (ref 150–400)
POTASSIUM SERPL-MCNC: 3.4 MMOL/L — LOW (ref 3.5–5.3)
POTASSIUM SERPL-SCNC: 3.4 MMOL/L — LOW (ref 3.5–5.3)
RBC # BLD: 4.73 M/UL — SIGNIFICANT CHANGE UP (ref 4.4–5.2)
RBC # FLD: 15.5 % — SIGNIFICANT CHANGE UP (ref 11–15.6)
SODIUM SERPL-SCNC: 138 MMOL/L — SIGNIFICANT CHANGE UP (ref 135–145)
WBC # BLD: 8 K/UL — SIGNIFICANT CHANGE UP (ref 4.8–10.8)
WBC # FLD AUTO: 8 K/UL — SIGNIFICANT CHANGE UP (ref 4.8–10.8)

## 2017-04-06 RX ORDER — ACETAMINOPHEN 500 MG
650 TABLET ORAL EVERY 6 HOURS
Qty: 0 | Refills: 0 | Status: DISCONTINUED | OUTPATIENT
Start: 2017-04-06 | End: 2017-04-07

## 2017-04-06 RX ORDER — POTASSIUM CHLORIDE 20 MEQ
40 PACKET (EA) ORAL EVERY 4 HOURS
Qty: 0 | Refills: 0 | Status: COMPLETED | OUTPATIENT
Start: 2017-04-06 | End: 2017-04-06

## 2017-04-06 RX ORDER — POTASSIUM CHLORIDE 20 MEQ
10 PACKET (EA) ORAL
Qty: 0 | Refills: 0 | Status: DISCONTINUED | OUTPATIENT
Start: 2017-04-06 | End: 2017-04-06

## 2017-04-06 RX ORDER — TRAMADOL HYDROCHLORIDE 50 MG/1
50 TABLET ORAL EVERY 6 HOURS
Qty: 0 | Refills: 0 | Status: DISCONTINUED | OUTPATIENT
Start: 2017-04-06 | End: 2017-04-07

## 2017-04-06 RX ORDER — POTASSIUM PHOSPHATE, MONOBASIC POTASSIUM PHOSPHATE, DIBASIC 236; 224 MG/ML; MG/ML
15 INJECTION, SOLUTION INTRAVENOUS ONCE
Qty: 0 | Refills: 0 | Status: COMPLETED | OUTPATIENT
Start: 2017-04-06 | End: 2017-04-06

## 2017-04-06 RX ORDER — KETOROLAC TROMETHAMINE 30 MG/ML
15 SYRINGE (ML) INJECTION EVERY 8 HOURS
Qty: 0 | Refills: 0 | Status: DISCONTINUED | OUTPATIENT
Start: 2017-04-06 | End: 2017-04-06

## 2017-04-06 RX ORDER — TAMSULOSIN HYDROCHLORIDE 0.4 MG/1
0.8 CAPSULE ORAL ONCE
Qty: 0 | Refills: 0 | Status: COMPLETED | OUTPATIENT
Start: 2017-04-06 | End: 2017-04-06

## 2017-04-06 RX ADMIN — POTASSIUM PHOSPHATE, MONOBASIC POTASSIUM PHOSPHATE, DIBASIC 62.5 MILLIMOLE(S): 236; 224 INJECTION, SOLUTION INTRAVENOUS at 15:06

## 2017-04-06 RX ADMIN — GABAPENTIN 300 MILLIGRAM(S): 400 CAPSULE ORAL at 11:40

## 2017-04-06 RX ADMIN — TAMSULOSIN HYDROCHLORIDE 0.8 MILLIGRAM(S): 0.4 CAPSULE ORAL at 15:02

## 2017-04-06 RX ADMIN — SODIUM CHLORIDE 3 MILLILITER(S): 9 INJECTION INTRAMUSCULAR; INTRAVENOUS; SUBCUTANEOUS at 21:26

## 2017-04-06 RX ADMIN — Medication 15 MILLIGRAM(S): at 14:20

## 2017-04-06 RX ADMIN — TRAMADOL HYDROCHLORIDE 25 MILLIGRAM(S): 50 TABLET ORAL at 01:02

## 2017-04-06 RX ADMIN — Medication 40 MILLIEQUIVALENT(S): at 13:55

## 2017-04-06 RX ADMIN — HEPARIN SODIUM 5000 UNIT(S): 5000 INJECTION INTRAVENOUS; SUBCUTANEOUS at 05:10

## 2017-04-06 RX ADMIN — HEPARIN SODIUM 5000 UNIT(S): 5000 INJECTION INTRAVENOUS; SUBCUTANEOUS at 17:29

## 2017-04-06 RX ADMIN — ALVIMOPAN 12 MILLIGRAM(S): 12 CAPSULE ORAL at 17:29

## 2017-04-06 RX ADMIN — Medication 15 MILLIGRAM(S): at 13:55

## 2017-04-06 RX ADMIN — Medication 15 MILLIGRAM(S): at 21:27

## 2017-04-06 RX ADMIN — PANTOPRAZOLE SODIUM 40 MILLIGRAM(S): 20 TABLET, DELAYED RELEASE ORAL at 11:40

## 2017-04-06 RX ADMIN — SODIUM CHLORIDE 50 MILLILITER(S): 9 INJECTION, SOLUTION INTRAVENOUS at 01:05

## 2017-04-06 RX ADMIN — ALVIMOPAN 12 MILLIGRAM(S): 12 CAPSULE ORAL at 05:10

## 2017-04-06 RX ADMIN — SODIUM CHLORIDE 3 MILLILITER(S): 9 INJECTION INTRAMUSCULAR; INTRAVENOUS; SUBCUTANEOUS at 05:18

## 2017-04-06 RX ADMIN — TRAMADOL HYDROCHLORIDE 25 MILLIGRAM(S): 50 TABLET ORAL at 02:02

## 2017-04-06 RX ADMIN — AMLODIPINE BESYLATE 10 MILLIGRAM(S): 2.5 TABLET ORAL at 05:10

## 2017-04-06 RX ADMIN — SODIUM CHLORIDE 3 MILLILITER(S): 9 INJECTION INTRAMUSCULAR; INTRAVENOUS; SUBCUTANEOUS at 13:50

## 2017-04-06 RX ADMIN — ONDANSETRON 4 MILLIGRAM(S): 8 TABLET, FILM COATED ORAL at 02:48

## 2017-04-06 RX ADMIN — Medication 15 MILLIGRAM(S): at 21:57

## 2017-04-06 RX ADMIN — Medication 1 DROP(S): at 21:27

## 2017-04-06 RX ADMIN — LATANOPROST 1 DROP(S): 0.05 SOLUTION/ DROPS OPHTHALMIC; TOPICAL at 21:27

## 2017-04-06 NOTE — PROGRESS NOTE ADULT - SUBJECTIVE AND OBJECTIVE BOX
INTERVAL HPI/OVERNIGHT EVENTS/SUBJECTIVE:    ICU Vital Signs Last 24 Hrs  T(C): 37.1, Max: 37.1 (04-06 @ 07:20)  T(F): 98.8, Max: 98.8 (04-06 @ 07:20)  HR: 79 (79 - 109)  BP: 148/67 (145/63 - 168/74)  BP(mean): --  ABP: --  ABP(mean): --  RR: 18 (18 - 20)  SpO2: 90% (90% - 94%)      I&O's Detail    I & Os for current day (as of 06 Apr 2017 11:58)  =============================================  IN:    lactated ringers.: 2100 ml    Total IN: 2100 ml  ---------------------------------------------  OUT:    Indwelling Catheter - Urethral: 550 ml    Total OUT: 550 ml  ---------------------------------------------  Total NET: 1550 ml            MEDICATIONS  (STANDING):  sodium chloride 0.9% lock flush 3milliLiter(s) IV Push every 8 hours  heparin  Injectable 5000Unit(s) SubCutaneous every 12 hours  alvimopan 12milliGRAM(s) Oral two times a day  gabapentin 300milliGRAM(s) Oral daily  latanoprost 0.005% Ophthalmic Solution 1Drop(s) Both EYES at bedtime  timolol 0.25% Solution 1Drop(s) Both EYES at bedtime  cloNIDine Patch 0.2 mG/24Hr(s) 1patch Topical every 7 days  amLODIPine   Tablet 10milliGRAM(s) Oral daily  pantoprazole  Injectable 40milliGRAM(s) IV Push daily  potassium chloride    Tablet ER 40milliEquivalent(s) Oral every 4 hours  potassium chloride  10 mEq/100 mL IVPB 10milliEquivalent(s) IV Intermittent every 1 hour  acetaminophen   Tablet. 650milliGRAM(s) Oral every 6 hours  ketorolac   Injectable 15milliGRAM(s) IV Push every 8 hours  tamsulosin 0.8milliGRAM(s) Oral once    MEDICATIONS  (PRN):  ondansetron Injectable 4milliGRAM(s) IV Push every 6 hours PRN Nausea and/or Vomiting  hydrALAZINE Injectable 20milliGRAM(s) IV Push every 6 hours PRN sbp > 160  traMADol 25milliGRAM(s) Oral every 6 hours PRN Moderate Pain (4 - 6)  HYDROmorphone  Injectable 0.25milliGRAM(s) IV Push every 4 hours PRN Moderate Pain (4 - 6)      NUTRITION/IVF:     CENTRAL LINE:  LOCATION:   DATE INSERTED:  CVP:  SCVO2:    CHENG:   DATE INSERTED:    A-LINE:    LOCATION:   DATE INSERTED:   SVV:  CO/CI:     CHEST TUBE:  LOCATION:  DATE INSERTED: OUTPUT/24 HRS:  SUCTION/WATER SEAL:     NG/OG TUBE:  DATE INSERTED:  OUTPUT/24 HRS:    MISC:     PHYSICAL EXAM:    Gen:    Eyes:    Neurological:    ENMT:    Neck:    Pulmonary:    Cardiovascular:    Gastrointestinal:    Genitourinary:    Back:    Extremities:    Skin:    Musculoskeletal:          LABS:  CBC Full  -  ( 06 Apr 2017 06:46 )  WBC Count : 8.0 K/uL  Hemoglobin : 13.4 g/dL  Hematocrit : 42.6 %  Platelet Count - Automated : 198 K/uL  Mean Cell Volume : 90.1 fl  Mean Cell Hemoglobin : 28.3 pg  Mean Cell Hemoglobin Concentration : 31.5 g/dL  Auto Neutrophil # : 6.7 K/uL  Auto Lymphocyte # : 0.3 K/uL  Auto Monocyte # : 1.0 K/uL  Auto Eosinophil # : x  Auto Basophil # : x  Auto Neutrophil % : 84.1 %  Auto Lymphocyte % : 3.8 %  Auto Monocyte % : 12.0 %  Auto Eosinophil % : x  Auto Basophil % : x    04-06    138  |  100  |  11.0  ----------------------------<  86  3.4<L>   |  31.0<H>  |  0.44<L>    Ca    10.0      06 Apr 2017 06:46  Phos  1.3     04-06  Mg     2.2     04-06          RECENT CULTURES:            CAPILLARY BLOOD GLUCOSE      RADIOLOGY & ADDITIONAL STUDIES:    ASSESSMENT/PLAN:  89yFemale presenting with:    Neuro:    HEENT:    CV:    Pulm:    GI/Nutrition:    /Renal:    ID:    Lines/Tubes:    Endo:    Skin:    Proph:    Dispo:      CRITICAL CARE TIME SPENT: INTERVAL HPI/OVERNIGHT EVENTS/SUBJECTIVE:  POD # 3  s/p robotic assisted right jd-colectomy  IV: LR @ 100cc/hr  diet: clear liquids  Patient: afebrile VSS awake and responsive lying  in bed appears comfortable , no acute distress.  family member states little wiped out by pain medication.  but improved witth changing of meds. López in place, resinserted due to urinary retention        Vital Signs Last 24 Hrs  T(C): 37.1, Max: 37.1 (04-06 @ 07:20)  T(F): 98.8, Max: 98.8 (04-06 @ 07:20)  HR: 79 (79 - 109)  BP: 148/67 (145/63 - 168/74)  BP(mean): --  ABP: --  ABP(mean): --  RR: 18 (18 - 20)  SpO2: 90% (90% - 94%)      I&O's Detail    I & Os for current day (as of 06 Apr 2017 11:58)  =============================================  IN:    lactated ringers.: 2100 ml    Total IN: 2100 ml  ---------------------------------------------  OUT:    Indwelling Catheter - Urethral: 550 ml    Total OUT: 550 ml  ---------------------------------------------  Total NET: 1550 ml            MEDICATIONS  (STANDING):  sodium chloride 0.9% lock flush 3milliLiter(s) IV Push every 8 hours  heparin  Injectable 5000Unit(s) SubCutaneous every 12 hours  alvimopan 12milliGRAM(s) Oral two times a day  gabapentin 300milliGRAM(s) Oral daily  latanoprost 0.005% Ophthalmic Solution 1Drop(s) Both EYES at bedtime  timolol 0.25% Solution 1Drop(s) Both EYES at bedtime  cloNIDine Patch 0.2 mG/24Hr(s) 1patch Topical every 7 days  amLODIPine   Tablet 10milliGRAM(s) Oral daily  pantoprazole  Injectable 40milliGRAM(s) IV Push daily  potassium chloride    Tablet ER 40milliEquivalent(s) Oral every 4 hours  potassium chloride  10 mEq/100 mL IVPB 10milliEquivalent(s) IV Intermittent every 1 hour  acetaminophen   Tablet. 650milliGRAM(s) Oral every 6 hours  ketorolac   Injectable 15milliGRAM(s) IV Push every 8 hours  tamsulosin 0.8milliGRAM(s) Oral once    MEDICATIONS  (PRN):  ondansetron Injectable 4milliGRAM(s) IV Push every 6 hours PRN Nausea and/or Vomiting  hydrALAZINE Injectable 20milliGRAM(s) IV Push every 6 hours PRN sbp > 160  traMADol 25milliGRAM(s) Oral every 6 hours PRN Moderate Pain (4 - 6)  HYDROmorphone  Injectable 0.25milliGRAM(s) IV Push every 4 hours PRN Moderate Pain (4 - 6)        PHYSICAL EXAM:    Gen: alert awake NAD   chest: fair air exchange, no apparent SOB  Abdomen:  soft mild surgical incisional discomfort, hypo-active BS, surgical site  provena in place.  denies flatus, or bm yet  .  Extremities: warm to toes with out calf pain or tenderness- get OOB today              LABS:  CBC Full  -  ( 06 Apr 2017 06:46 )  WBC Count : 8.0 K/uL  Hemoglobin : 13.4 g/dL  Hematocrit : 42.6 %  Platelet Count - Automated : 198 K/uL  Mean Cell Volume : 90.1 fl  Mean Cell Hemoglobin : 28.3 pg  Mean Cell Hemoglobin Concentration : 31.5 g/dL  Auto Neutrophil # : 6.7 K/uL  Auto Lymphocyte # : 0.3 K/uL  Auto Monocyte # : 1.0 K/uL  Auto Eosinophil # : x  Auto Basophil # : x  Auto Neutrophil % : 84.1 %  Auto Lymphocyte % : 3.8 %  Auto Monocyte % : 12.0 %  Auto Eosinophil % : x  Auto Basophil % : x    04-06    138  |  100  |  11.0  ----------------------------<  86  3.4<L>   |  31.0<H>  |  0.44<L>    Ca    10.0      06 Apr 2017 06:46  Phos  1.3     04-06  Mg     2.2     04-06          RECENT CULTURES:            CAPILLARY BLOOD GLUCOSE      RADIOLOGY & ADDITIONAL STUDIES:    ASSESSMENT/PLAN:  89yFemale s/p lap robotix asst hemicolectomy pod# 3 stable doing well   adv diet, LR   dvt ppx   oob/ambulation   flomax given, plan for trial of void tonight   labs and supplements done   tylenol and toradol for pain, dilaudid d/harrison.   d/c IVF

## 2017-04-06 NOTE — PROGRESS NOTE ADULT - ATTENDING COMMENTS
Patient seen and examined. Doing well. Advance to solid food. Physical therapy and discharge planning. Replace electrolytes. Attempt hunter removal again and will plan to leave hunter in if she fails again.
d/c narcotics ultram for pain  out of bed and ambulate
I have seen and examined patient. Doing well this am. Advance to liquids. López out. Wean IVFs. Physical therapy.

## 2017-04-06 NOTE — PROGRESS NOTE ADULT - SUBJECTIVE AND OBJECTIVE BOX
HPI:  89 yo female had rectal bleeding while on lovenox, colonoscopy revealed mass in cecum planning right hemicolectomy. (21 Mar 2017 10:50)     Allergies    aspirin (Other (Mild to Mod))    Intolerances    Cardizem (Faint)  Tylenol (Other)    Fracture  Valvular disease  Mass of cecum  Shingles  Borderline diabetes  Hemorrhoid  Diverticulosis  Hypertension  Glaucoma    MEDICATIONS  (STANDING):  sodium chloride 0.9% lock flush 3milliLiter(s) IV Push every 8 hours  heparin  Injectable 5000Unit(s) SubCutaneous every 12 hours  alvimopan 12milliGRAM(s) Oral two times a day  gabapentin 300milliGRAM(s) Oral daily  latanoprost 0.005% Ophthalmic Solution 1Drop(s) Both EYES at bedtime  timolol 0.25% Solution 1Drop(s) Both EYES at bedtime  cloNIDine Patch 0.2 mG/24Hr(s) 1patch Topical every 7 days  amLODIPine   Tablet 10milliGRAM(s) Oral daily  pantoprazole  Injectable 40milliGRAM(s) IV Push daily  acetaminophen   Tablet. 650milliGRAM(s) Oral every 6 hours  ketorolac   Injectable 15milliGRAM(s) IV Push every 8 hours    MEDICATIONS  (PRN):  ondansetron Injectable 4milliGRAM(s) IV Push every 6 hours PRN Nausea and/or Vomiting  hydrALAZINE Injectable 20milliGRAM(s) IV Push every 6 hours PRN sbp > 160  traMADol 25milliGRAM(s) Oral every 6 hours PRN Moderate Pain (4 - 6)                           13.4   8.0   )-----------( 198      ( 06 Apr 2017 06:46 )             42.6     04-06    138  |  100  |  11.0  ----------------------------<  86  3.4<L>   |  31.0<H>  |  0.44<L>    Ca    10.0      06 Apr 2017 06:46  Phos  1.3     04-06  Mg     2.2     04-06        ;  Vital Signs Last 24 Hrs  T(C): 36.7, Max: 37.1 (04-06 @ 07:20)  T(F): 98.1, Max: 98.8 (04-06 @ 07:20)  HR: 86 (79 - 91)  BP: 139/63 (139/63 - 154/69)  BP(mean): --  RR: 19 (18 - 19)  SpO2: 94% (90% - 94%)  CAPILLARY BLOOD GLUCOSE    I & Os for 24h ending 04-06 @ 07:00  =============================================  IN: 2100 ml / OUT: 550 ml / NET: 1550 ml    I & Os for current day (as of 04-06 @ 18:00)  =============================================  IN: 0 ml / OUT: 200 ml / NET: -200 ml      Patient appears comfortable No CP, No SOB, No N/V Poor Po intake but mild increase today    HEENT: PEARLA  Neck: Supple  Cardio: S1 S2 SE  Murmur  Pulm: CTA No Rales or Ronchi  Abd: Soft NT ND BS+, Midline incision  Rectal - refused  Ext: No DCT  Skin: No Rash  Neuro: Awake more verbal today      HTN - clonidine patch, Hydralazine, Norvasc improving  Dementia - daughter at bedside   Glaucoma  - cont drops  Urinary Retention - hunter placed TOV tonight  Anorexia - mild improvement marinol trial HPI:  89 yo female had rectal bleeding while on lovenox, colonoscopy revealed mass in cecum planning right hemicolectomy. (21 Mar 2017 10:50)     Allergies    aspirin (Other (Mild to Mod))    Intolerances    Cardizem (Faint)  Tylenol (Other)    Fracture  Valvular disease  Mass of cecum  Shingles  Borderline diabetes  Hemorrhoid  Diverticulosis  Hypertension  Glaucoma    MEDICATIONS  (STANDING):  sodium chloride 0.9% lock flush 3milliLiter(s) IV Push every 8 hours  heparin  Injectable 5000Unit(s) SubCutaneous every 12 hours  alvimopan 12milliGRAM(s) Oral two times a day  gabapentin 300milliGRAM(s) Oral daily  latanoprost 0.005% Ophthalmic Solution 1Drop(s) Both EYES at bedtime  timolol 0.25% Solution 1Drop(s) Both EYES at bedtime  cloNIDine Patch 0.2 mG/24Hr(s) 1patch Topical every 7 days  amLODIPine   Tablet 10milliGRAM(s) Oral daily  pantoprazole  Injectable 40milliGRAM(s) IV Push daily  acetaminophen   Tablet. 650milliGRAM(s) Oral every 6 hours  ketorolac   Injectable 15milliGRAM(s) IV Push every 8 hours    MEDICATIONS  (PRN):  ondansetron Injectable 4milliGRAM(s) IV Push every 6 hours PRN Nausea and/or Vomiting  hydrALAZINE Injectable 20milliGRAM(s) IV Push every 6 hours PRN sbp > 160  traMADol 25milliGRAM(s) Oral every 6 hours PRN Moderate Pain (4 - 6)                           13.4   8.0   )-----------( 198      ( 06 Apr 2017 06:46 )             42.6     04-06    138  |  100  |  11.0  ----------------------------<  86  3.4<L>   |  31.0<H>  |  0.44<L>    Ca    10.0      06 Apr 2017 06:46  Phos  1.3     04-06  Mg     2.2     04-06        ;  Vital Signs Last 24 Hrs  T(C): 36.7, Max: 37.1 (04-06 @ 07:20)  T(F): 98.1, Max: 98.8 (04-06 @ 07:20)  HR: 86 (79 - 91)  BP: 139/63 (139/63 - 154/69)  BP(mean): --  RR: 19 (18 - 19)  SpO2: 94% (90% - 94%)  CAPILLARY BLOOD GLUCOSE    I & Os for 24h ending 04-06 @ 07:00  =============================================  IN: 2100 ml / OUT: 550 ml / NET: 1550 ml    I & Os for current day (as of 04-06 @ 18:00)  =============================================  IN: 0 ml / OUT: 200 ml / NET: -200 ml      Patient appears comfortable No CP, No SOB, No N/V Poor Po intake but mild increase today    HEENT: PEARLA  Neck: Supple  Cardio: S1 S2 SE  Murmur  Pulm: CTA No Rales or Ronchi  Abd: Soft NT ND BS+, Midline incision  Rectal - refused  Ext: No DCT  Skin: No Rash  Neuro: Awake more verbal today      HTN - clonidine patch, Hydralazine, Norvasc improving  Dementia - daughter at bedside   Glaucoma  - cont drops  Urinary Retention - hunter placed TOV tonight  Anorexia - mild improvement marinol trial  Pain - Improved with ultram daughter wants dose increased

## 2017-04-07 ENCOUNTER — TRANSCRIPTION ENCOUNTER (OUTPATIENT)
Age: 82
End: 2017-04-07

## 2017-04-07 VITALS
HEART RATE: 91 BPM | DIASTOLIC BLOOD PRESSURE: 82 MMHG | SYSTOLIC BLOOD PRESSURE: 143 MMHG | OXYGEN SATURATION: 94 % | RESPIRATION RATE: 18 BRPM | TEMPERATURE: 98 F

## 2017-04-07 LAB
ANION GAP SERPL CALC-SCNC: 7 MMOL/L — SIGNIFICANT CHANGE UP (ref 5–17)
ANISOCYTOSIS BLD QL: SLIGHT — SIGNIFICANT CHANGE UP
BUN SERPL-MCNC: 12 MG/DL — SIGNIFICANT CHANGE UP (ref 8–20)
CALCIUM SERPL-MCNC: 9.8 MG/DL — SIGNIFICANT CHANGE UP (ref 8.6–10.2)
CHLORIDE SERPL-SCNC: 98 MMOL/L — SIGNIFICANT CHANGE UP (ref 98–107)
CO2 SERPL-SCNC: 27 MMOL/L — SIGNIFICANT CHANGE UP (ref 22–29)
CREAT SERPL-MCNC: 0.47 MG/DL — LOW (ref 0.5–1.3)
ELLIPTOCYTES BLD QL SMEAR: SLIGHT — SIGNIFICANT CHANGE UP
EOSINOPHIL NFR BLD AUTO: 1 % — SIGNIFICANT CHANGE UP (ref 0–5)
GLUCOSE SERPL-MCNC: 106 MG/DL — SIGNIFICANT CHANGE UP (ref 70–115)
HCT VFR BLD CALC: 40.8 % — SIGNIFICANT CHANGE UP (ref 37–47)
HGB BLD-MCNC: 12.9 G/DL — SIGNIFICANT CHANGE UP (ref 12–16)
LYMPHOCYTES # BLD AUTO: 9 % — LOW (ref 20–55)
MAGNESIUM SERPL-MCNC: 2 MG/DL — SIGNIFICANT CHANGE UP (ref 1.8–2.5)
MCHC RBC-ENTMCNC: 28.2 PG — SIGNIFICANT CHANGE UP (ref 27–31)
MCHC RBC-ENTMCNC: 31.6 G/DL — LOW (ref 32–36)
MCV RBC AUTO: 89.1 FL — SIGNIFICANT CHANGE UP (ref 81–99)
MONOCYTES NFR BLD AUTO: 14 % — HIGH (ref 3–10)
NEUTROPHILS NFR BLD AUTO: 75 % — HIGH (ref 37–73)
NEUTS BAND # BLD: 1 % — SIGNIFICANT CHANGE UP (ref 0–8)
OVALOCYTES BLD QL SMEAR: SLIGHT — SIGNIFICANT CHANGE UP
PHOSPHATE SERPL-MCNC: 1.7 MG/DL — LOW (ref 2.4–4.7)
PLAT MORPH BLD: NORMAL — SIGNIFICANT CHANGE UP
PLATELET # BLD AUTO: 151 K/UL — SIGNIFICANT CHANGE UP (ref 150–400)
POIKILOCYTOSIS BLD QL AUTO: SLIGHT — SIGNIFICANT CHANGE UP
POTASSIUM SERPL-MCNC: 4.1 MMOL/L — SIGNIFICANT CHANGE UP (ref 3.5–5.3)
POTASSIUM SERPL-SCNC: 4.1 MMOL/L — SIGNIFICANT CHANGE UP (ref 3.5–5.3)
RBC # BLD: 4.58 M/UL — SIGNIFICANT CHANGE UP (ref 4.4–5.2)
RBC # FLD: 15.4 % — SIGNIFICANT CHANGE UP (ref 11–15.6)
RBC BLD AUTO: ABNORMAL
SODIUM SERPL-SCNC: 132 MMOL/L — LOW (ref 135–145)
SURGICAL PATHOLOGY FINAL REPORT - CH: SIGNIFICANT CHANGE UP
WBC # BLD: 4.1 K/UL — LOW (ref 4.8–10.8)
WBC # FLD AUTO: 4.1 K/UL — LOW (ref 4.8–10.8)

## 2017-04-07 PROCEDURE — 88302 TISSUE EXAM BY PATHOLOGIST: CPT

## 2017-04-07 PROCEDURE — 36415 COLL VENOUS BLD VENIPUNCTURE: CPT

## 2017-04-07 PROCEDURE — 84100 ASSAY OF PHOSPHORUS: CPT

## 2017-04-07 PROCEDURE — 97163 PT EVAL HIGH COMPLEX 45 MIN: CPT

## 2017-04-07 PROCEDURE — 97530 THERAPEUTIC ACTIVITIES: CPT

## 2017-04-07 PROCEDURE — 83735 ASSAY OF MAGNESIUM: CPT

## 2017-04-07 PROCEDURE — 80048 BASIC METABOLIC PNL TOTAL CA: CPT

## 2017-04-07 PROCEDURE — 97116 GAIT TRAINING THERAPY: CPT

## 2017-04-07 PROCEDURE — 85027 COMPLETE CBC AUTOMATED: CPT

## 2017-04-07 PROCEDURE — 88307 TISSUE EXAM BY PATHOLOGIST: CPT

## 2017-04-07 RX ORDER — TRAMADOL HYDROCHLORIDE 50 MG/1
1 TABLET ORAL
Qty: 16 | Refills: 0 | OUTPATIENT
Start: 2017-04-07 | End: 2017-04-11

## 2017-04-07 RX ORDER — SENNA PLUS 8.6 MG/1
1 TABLET ORAL
Qty: 14 | Refills: 0 | OUTPATIENT
Start: 2017-04-07 | End: 2017-04-21

## 2017-04-07 RX ORDER — DOCUSATE SODIUM 100 MG
1 CAPSULE ORAL
Qty: 14 | Refills: 0 | OUTPATIENT
Start: 2017-04-07 | End: 2017-04-21

## 2017-04-07 RX ADMIN — SODIUM CHLORIDE 3 MILLILITER(S): 9 INJECTION INTRAMUSCULAR; INTRAVENOUS; SUBCUTANEOUS at 05:14

## 2017-04-07 RX ADMIN — GABAPENTIN 300 MILLIGRAM(S): 400 CAPSULE ORAL at 11:40

## 2017-04-07 RX ADMIN — AMLODIPINE BESYLATE 10 MILLIGRAM(S): 2.5 TABLET ORAL at 05:18

## 2017-04-07 RX ADMIN — HEPARIN SODIUM 5000 UNIT(S): 5000 INJECTION INTRAVENOUS; SUBCUTANEOUS at 05:18

## 2017-04-07 RX ADMIN — Medication 63.75 MILLIMOLE(S): at 11:40

## 2017-04-07 RX ADMIN — ALVIMOPAN 12 MILLIGRAM(S): 12 CAPSULE ORAL at 05:18

## 2017-04-07 NOTE — DISCHARGE NOTE ADULT - HOSPITAL COURSE
89 yo female who had rectal bleeding while on lovenox, colonoscopy revealed mass in cecum planning right hemicolectomy.    Hospital Course: Patient was taken to the OR on 4/3 for a lap robotic assisted right hemicolectomy. Procedure was completed without complication and patient was transferred to the PACU and then the floor in stable condition. 89 yo female who had rectal bleeding while on lovenox, colonoscopy revealed mass in cecum planning right hemicolectomy.    Hospital Course: Patient was taken to the OR on 4/3 for a lap robotic assisted right hemicolectomy. Procedure was completed without complication and patient was transferred to the PACU and then the floor in stable condition. Post-operatively, diet was slowly advanced for which patient tolerated well. Pain medications were transitioned from IV to PO and gave adequate relief of post-operative pain. Physical therapy was consulted - recommended subacute rehabilitation vs. home with PT. Post-op course complicated by urinary retention for which hunter catheter was placed. Hunter then removed on 4/6 - patient passed trial of void. At time of discharge, patient is hemodynamically well, tolerating a regular diet without nausea / vomiting, voiding independently, pain controlled on oral medications, and is meeting objective criteria for discharge home with outpatient follow-up as outlined above.    Patient is advised to RETURN TO THE ED for any of the following - worsening abdominal pain, nausea, vomiting, altered mental status, chest pain, shortness of breath, or any other new / worsening symptom.

## 2017-04-07 NOTE — PROGRESS NOTE ADULT - SUBJECTIVE AND OBJECTIVE BOX
INTERVAL HPI/OVERNIGHT EVENTS/SUBJECTIVE:  POD #4  s/p robotic assisted right jd-colectomy  IV: SL  diet: LR   Patient: afebrile VSS awake and responsive lying  in bed appears comfortable , no acute distress.  López pulled last last night, awaiting void        Vital Signs Last 24 Hrs  T(C): 36.5, Max: 37.1 (04-07 @ 00:00)  T(F): 97.7, Max: 98.7 (04-07 @ 00:00)  HR: 68 (68 - 87)  BP: 156/78 (131/69 - 156/78)  BP(mean): --  ABP: --  ABP(mean): --  RR: 19 (18 - 19)  SpO2: 96% (92% - 97%)      I&O's Detail    I & Os for current day (as of 07 Apr 2017 08:07)  =============================================  IN:    Total IN: 0 ml  ---------------------------------------------  OUT:    Indwelling Catheter - Urethral: 400 ml    Voided: 100 ml    Total OUT: 500 ml  ---------------------------------------------  Total NET: -500 ml            MEDICATIONS  (STANDING):  sodium chloride 0.9% lock flush 3milliLiter(s) IV Push every 8 hours  heparin  Injectable 5000Unit(s) SubCutaneous every 12 hours  alvimopan 12milliGRAM(s) Oral two times a day  gabapentin 300milliGRAM(s) Oral daily  latanoprost 0.005% Ophthalmic Solution 1Drop(s) Both EYES at bedtime  timolol 0.25% Solution 1Drop(s) Both EYES at bedtime  cloNIDine Patch 0.2 mG/24Hr(s) 1patch Topical every 7 days  amLODIPine   Tablet 10milliGRAM(s) Oral daily  pantoprazole  Injectable 40milliGRAM(s) IV Push daily  acetaminophen   Tablet. 650milliGRAM(s) Oral every 6 hours  sodium phosphate IVPB 15milliMole(s) IV Intermittent every 6 hours    MEDICATIONS  (PRN):  ondansetron Injectable 4milliGRAM(s) IV Push every 6 hours PRN Nausea and/or Vomiting  hydrALAZINE Injectable 20milliGRAM(s) IV Push every 6 hours PRN sbp > 160  traMADol 50milliGRAM(s) Oral every 6 hours PRN Moderate Pain (4 - 6)      NUTRITION/IVF:  LR     MISC:     PHYSICAL EXAM:    Gen: alert awake NAD    chest: fair air exchange, no apparent SOB  Abdomen:  soft mild surgical incisional discomfort, hypo-active BS, surgical site  provena in place. +flatus   .  Extremities: warm to toes with out calf pain or tenderness- get OOB today              LABS:  CBC Full  -  ( 07 Apr 2017 05:27 )  WBC Count : 4.1 K/uL  Hemoglobin : 12.9 g/dL  Hematocrit : 40.8 %  Platelet Count - Automated : 151 K/uL  Mean Cell Volume : 89.1 fl  Mean Cell Hemoglobin : 28.2 pg  Mean Cell Hemoglobin Concentration : 31.6 g/dL  Auto Neutrophil # : x  Auto Lymphocyte # : x  Auto Monocyte # : x  Auto Eosinophil # : x  Auto Basophil # : x  Auto Neutrophil % : 75.0 %  Auto Lymphocyte % : 9.0 %  Auto Monocyte % : 14.0 %  Auto Eosinophil % : 1.0 %  Auto Basophil % : x    04-07    132<L>  |  98  |  12.0  ----------------------------<  106  4.1   |  27.0  |  0.47<L>    Ca    9.8      07 Apr 2017 05:27  Phos  1.7     04-07  Mg     2.0     04-07          RECENT CULTURES:            CAPILLARY BLOOD GLUCOSE      RADIOLOGY & ADDITIONAL STUDIES:    ASSESSMENT/PLAN:  89yFemale s/p lap robotix asst hemicolectomy pod# 4 stable doing well    LR diet   d/c provena vac   dvt ppx   oob/ambulation   d/c planning   labs and supplements done   tylenol and toradol for pain, limit narcotics

## 2017-04-07 NOTE — DISCHARGE NOTE ADULT - CARE PLAN
Principal Discharge DX:	Mass of cecum  Secondary Diagnosis:	Hypertension Principal Discharge DX:	Polyp of ascending colon, unspecified type  Goal:	Alleviation of pain and symptoms  Instructions for follow-up, activity and diet:	Follow up: Please call and make an appointment with DR. MCKNIGHT 2 weeks after discharge. Also, please call and make an appointment with your primary care physician as per your usual schedule.   Activity: May return to normal activities as tolerated. Please avoid heavy lifting > 10-15 lbs  Diet: May continue low fiber diet  Medications: Please take all home medications as prescribed by your primary care doctor. Pain medication has been prescribed for you (TRAMADOL). Please, take it as it has been prescribed, do not drive or operate heavy machinery while taking narcotics. Please also take over-the-counter colace and senna to prevent constipation.  Wound Care: Please, keep wound site clean and dry. You may shower, but do not bathe.  If confusion, altered mental status, fever, chest pain, shortness of breath, new or worsening abdominal pain, vomiting, change or worsening of medical status, please come back to the emergency room, and in case of emergency call 911.  Secondary Diagnosis:	Hypertension  Instructions for follow-up, activity and diet:	Please resume home blood pressure medications, monitor blood pressure at home, and follow-up with your primary care provider and/or your cardiologist as per your usual schedule

## 2017-04-07 NOTE — DISCHARGE NOTE ADULT - PATIENT PORTAL LINK FT
“You can access the Follow[employer] Patient Portal, offered by [facility], by registering with the following website: [email or web address]”

## 2017-04-07 NOTE — DISCHARGE NOTE ADULT - CARE PROVIDER_API CALL
Gustavo Thompson), ColonRectal Surgery; Surgery  [address] [facility] ([address] [place] [address]  Phone: [phone]  Fax: [phone]

## 2017-04-07 NOTE — PROGRESS NOTE ADULT - ASSESSMENT
Lap robotic assisted R hemicolectomy  / advance to [facility] today   / PT ordered   / serial abdominal exams   / f/u TOV   / Pain control   / medical management as per Dr. Lr  / Plan discussed w/ Dr. Gomez
as above
as above
stable

## 2017-04-07 NOTE — DISCHARGE NOTE ADULT - PLAN OF CARE
Alleviation of pain and symptoms Follow up: Please call and make an appointment with DR. MCKNIGHT 2 weeks after discharge. Also, please call and make an appointment with your primary care physician as per your usual schedule.   Activity: May return to normal activities as tolerated. Please avoid heavy lifting > 10-15 lbs  Diet: May continue low fiber diet  Medications: Please take all home medications as prescribed by your primary care doctor. Pain medication has been prescribed for you (TRAMADOL). Please, take it as it has been prescribed, do not drive or operate heavy machinery while taking narcotics. Please also take over-the-counter colace and senna to prevent constipation.  Wound Care: Please, keep wound site clean and dry. You may shower, but do not bathe.  If confusion, altered mental status, fever, chest pain, shortness of breath, new or worsening abdominal pain, vomiting, change or worsening of medical status, please come back to the emergency room, and in case of emergency call 911. Please resume home blood pressure medications, monitor blood pressure at home, and follow-up with your primary care provider and/or your cardiologist as per your usual schedule

## 2017-04-07 NOTE — DISCHARGE NOTE ADULT - CARE PROVIDERS DIRECT ADDRESSES
[email or web address]

## 2017-04-21 ENCOUNTER — APPOINTMENT (OUTPATIENT)
Dept: COLORECTAL SURGERY | Facility: CLINIC | Age: 82
End: 2017-04-21

## 2017-04-21 VITALS
HEART RATE: 56 BPM | DIASTOLIC BLOOD PRESSURE: 79 MMHG | TEMPERATURE: 98.2 F | HEIGHT: 64 IN | BODY MASS INDEX: 24.75 KG/M2 | WEIGHT: 145 LBS | SYSTOLIC BLOOD PRESSURE: 172 MMHG | RESPIRATION RATE: 12 BRPM

## 2017-04-21 DIAGNOSIS — K63.9 DISEASE OF INTESTINE, UNSPECIFIED: ICD-10-CM

## 2017-05-01 ENCOUNTER — OUTPATIENT (OUTPATIENT)
Dept: OUTPATIENT SERVICES | Facility: HOSPITAL | Age: 82
LOS: 1 days | End: 2017-05-01
Payer: MEDICARE

## 2017-05-01 DIAGNOSIS — R26.81 UNSTEADINESS ON FEET: ICD-10-CM

## 2017-05-01 DIAGNOSIS — Z51.89 ENCOUNTER FOR OTHER SPECIFIED AFTERCARE: ICD-10-CM

## 2017-05-01 DIAGNOSIS — R27.9 UNSPECIFIED LACK OF COORDINATION: ICD-10-CM

## 2017-05-01 PROCEDURE — G8978: CPT | Mod: CJ

## 2017-05-01 PROCEDURE — G8979: CPT | Mod: CI

## 2017-05-01 PROCEDURE — 97162 PT EVAL MOD COMPLEX 30 MIN: CPT

## 2017-05-23 ENCOUNTER — APPOINTMENT (OUTPATIENT)
Dept: COLORECTAL SURGERY | Facility: CLINIC | Age: 82
End: 2017-05-23

## 2017-06-06 ENCOUNTER — NON-APPOINTMENT (OUTPATIENT)
Age: 82
End: 2017-06-06

## 2017-06-06 ENCOUNTER — APPOINTMENT (OUTPATIENT)
Dept: CARDIOLOGY | Facility: CLINIC | Age: 82
End: 2017-06-06

## 2017-06-06 VITALS
SYSTOLIC BLOOD PRESSURE: 149 MMHG | BODY MASS INDEX: 23.39 KG/M2 | OXYGEN SATURATION: 96 % | HEIGHT: 64 IN | DIASTOLIC BLOOD PRESSURE: 64 MMHG | HEART RATE: 51 BPM | WEIGHT: 137 LBS

## 2017-06-06 VITALS — SYSTOLIC BLOOD PRESSURE: 128 MMHG | DIASTOLIC BLOOD PRESSURE: 56 MMHG

## 2017-06-06 DIAGNOSIS — I10 ESSENTIAL (PRIMARY) HYPERTENSION: ICD-10-CM

## 2017-06-06 DIAGNOSIS — R00.1 BRADYCARDIA, UNSPECIFIED: ICD-10-CM

## 2017-06-06 DIAGNOSIS — R94.31 ABNORMAL ELECTROCARDIOGRAM [ECG] [EKG]: ICD-10-CM

## 2018-06-05 ENCOUNTER — APPOINTMENT (OUTPATIENT)
Dept: CARDIOLOGY | Facility: CLINIC | Age: 83
End: 2018-06-05

## 2018-07-16 PROBLEM — T14.8 OTHER INJURY OF UNSPECIFIED BODY REGION: Chronic | Status: ACTIVE | Noted: 2017-03-21

## 2019-06-19 NOTE — DISCHARGE NOTE ADULT - NS DC ANGIO PCI YN
SW PLAN OF CARE NOTE    Requested by ER to provide snf resource to patient. ADRC handbook and Senior Resource Guide given to patient. Process to enter LUPE also explained.    Sydni Blevins MSW, APSW    [phone]       no

## 2022-01-12 NOTE — PATIENT PROFILE ADULT. - FUNCTIONAL SCREEN CURRENT LEVEL: COMMUNICATION, MLM
(2) difficulty understanding (not related to language barrier) Libtayo Counseling- I discussed with the patient the risks of Libtayo including but not limited to nausea, vomiting, diarrhea, and bone or muscle pain.  The patient verbalized understanding of the proper use and possible adverse effects of Libtayo.  All of the patient's questions and concerns were addressed.

## 2022-10-04 NOTE — ED ADULT TRIAGE NOTE - RESPIRATORY RATE (BREATHS/MIN)
Today's visit was performed with the assistance of  Services.   Name of : Tanya (#[id number])   [facility]        20

## 2023-05-22 NOTE — DISCHARGE NOTE ADULT - MEDICATION SUMMARY - MEDICATIONS TO TAKE
Patient transported to unit by transport in stable condition on bedside monitor with infusion running per [facility] and with all patient belongings.       Lottie Sargent RN  05/22/23 1429 I will START or STAY ON the medications listed below when I get home from the hospital:    traMADol 50 mg oral tablet  -- 1 tab(s) by mouth every 6 hours, As Needed -for severe pain MDD:4  -- Caution federal law prohibits the transfer of this drug to any person other  than the person for whom it was prescribed.  May cause drowsiness.  Alcohol may intensify this effect.  Use care when operating dangerous machinery.  Obtain medical advice before taking any non-prescription drugs as some may affect the action of this medication.    -- Indication: For Pain    Catapres  -- 0.3 milligram(s) by transdermal patch once a week   -- Indication: For Home medication    gabapentin 300 mg oral capsule  --  by mouth once a day  -- Indication: For Home medication    felodipine 10 mg oral tablet, extended release  -- 1 tab(s) by mouth once a day  -- Indication: For Home medication    ferrous sulfate 325 mg (65 mg elemental iron) oral tablet  -- 1 tab(s) by mouth 3 times a day  -- Indication: For Home medication    Colace 100 mg oral capsule  -- 1 cap(s) by mouth once a day  -- Medication should be taken with plenty of water.    -- Indication: For Constipation as needed    senna oral tablet  -- 1 tab(s) by mouth once a day  -- Indication: For Constipation as needed    Combigan 0.2%-0.5% ophthalmic solution  -- 1 drop(s) to each affected eye every 12 hours  -- Indication: For Home medication    Travatan 0.004% ophthalmic solution  -- 1 drop(s) to each affected eye once a day (in the evening)  -- Indication: For Home medication    Protonix 40 mg oral delayed release tablet  -- 1 tab(s) by mouth once a day  -- Indication: For Home medication    hydrALAZINE 25 mg oral tablet  --  by mouth once a day  -- Indication: For Home medication